# Patient Record
Sex: MALE | Race: WHITE | NOT HISPANIC OR LATINO | Employment: FULL TIME | URBAN - METROPOLITAN AREA
[De-identification: names, ages, dates, MRNs, and addresses within clinical notes are randomized per-mention and may not be internally consistent; named-entity substitution may affect disease eponyms.]

---

## 2018-01-26 NOTE — PROGRESS NOTES
This note was created by combination of typed  and Dragon dictation.  Transcription errors may be present.  If there are any questions, please contact me.    Assessment & Plan  Nonintractable epilepsy without status epilepticus, unspecified epilepsy type-reports previous medications either didn't work or had side effects.  He is way overdue for follow-up with neurology and I put in referral.  Check labs.  -     Comprehensive metabolic panel; Future; Expected date: 01/29/2018  -     CBC auto differential; Future; Expected date: 01/29/2018  -     Ambulatory referral to Neurology    Anxiety-discussed with him that Xanax is not a good long-term control medication and I would not prescribe that in the future.   With a positive urine drug screen in the emergency room years ago I would avoid benzodiazepines and controlled substances.  After discussion he is agreeable to try Zoloft.  Start 50 mg and titrate up.  -     sertraline (ZOLOFT) 50 MG tablet; Take 1 tablet (50 mg total) by mouth once daily.  Dispense: 30 tablet; Refill: 11    Smoker-pre-contemplative stage of quitting    Hypertension, unspecified type-main focus is to try and get his pressures under control.  Recalls a history of erectile disorder with medication.  Could be from HCTZ.  For now start losartan /25.  The future may try monotherapy with losartan.  -     Comprehensive metabolic panel; Future; Expected date: 01/29/2018  -     losartan-hydrochlorothiazide 100-25 mg (HYZAAR) 100-25 mg per tablet; Take 1 tablet by mouth once daily.  Dispense: 30 tablet; Refill: 5    Hepatitis C virus infection without hepatic coma, unspecified chronicity-unconfirmed.  Check hepatitis C antibody, check RNA, check genotype.  If positive he'll need an ultrasound and referral to hepatology.  -     Hepatitis C antibody; Future; Expected date: 01/29/2018  -     Hepatitis C Viral RNA Genotype, LIPA; Future; Expected date: 01/29/2018  -     HEPATITIS C RNA,  QUANTITATIVE, PCR; Future; Expected date: 01/29/2018    Needs flu shot  -     Influenza - Quadrivalent (3 years & older) (PF)    Need for vaccination for Strep pneumoniae  -     Pneumococcal Polysaccharide Vaccine (23 Valent) (SQ/IM)    Right rotator cuff tendonitis-Home therapy handout.    Sebaceous cyst-On the neck.  Benign.  Reassurance.    Pineal gland cyst-check baseline labs first as he'll need MRI with contrast and a need to check creatinine function first.        Medications Discontinued During This Encounter   Medication Reason    hydrocodone-acetaminophen 5-325mg (NORCO) 5-325 mg per tablet Therapy completed       Follow-up: No Follow-up on file. follow-up 1 month BP check, lab results in the interim.      =================================================================      Chief Complaint   Patient presents with    [facility]       JENNIFER  Mamadou is a 42 y.o. male, last appointment with this clinic was Visit date not found.    New patient.  Last seen in our office 2012 and lost to follow up.  Hypertension, history of HCTZ, history of losartan  Anxiety, hx of xanax QID in the past.  Previous doctor would not fill it further and told him to see psych. Hx of zoloft, was wary of taking it.   Back pain with hx of narcotic therapy.  No longer taking that.   Hepatitis C by patient report, needs to see specialist.  Apparently this was diagnosed when he was hospitalized with cellulitis.  I see no labs in the system confirming this diagnosis.  Epilepsy.  Apparently diagnosed in [facility].  On disability for this. Hx of dilantin didn't work. Then depakote didn't like it. Then to tegretol.  Has been off of medication x years now.  Has not had a seizure recently he states.  But he does need to establish with neurology.  He is currently not on medications.  Possible pineal gland cyst seen on CT scan 2012.    He reports he has a home cuff and has been checking his blood pressures and they have been running high  all week.  Does recall a history of side effect of erectile dysfunction with his previous medications.  He notes anxiety, we did discuss that Xanax is not a good long-term control medication.  He was initially wary of trying Zoloft but after discussion would be agreeable to do so.  He has a cyst on his neck, MS, has not gone away despite manual pressure to try to decompress it.  Reports that he has a cyst on his brain.  I see CT scan from 2012 with possible pineal cyst.  It sounds however that his epilepsy may have preceded this cyst.  Reportedly was diagnosed with hepatitis C while hospitalized but I see no lab results from that.  He reports he was supposed to be referred to a specialist but never got that done.  Complaining of pain in his right shoulder without tender spot that is painful and pronounced.  No specific injury to this shoulder.  However does recall a remote history of fracture.  He is an active smoker, 1 pack a day.  Pre-contemplative stage of quitting.    Patient Care Team:  Primary Doctor No as PCP - General    Patient Active Problem List    Diagnosis Date Noted    Anxiety 01/29/2018    Smoker 01/29/2018    Pineal gland cyst 01/29/2018 9/22/2012 CT head: 16mm cystic pineal mass in which further characterization with contrast enhanced MR is recommended.  Considerations include pineocytoma as well pineoblastoma.      Hypertension     Epilepsy        PAST MEDICAL HISTORY:  Past Medical History:   Diagnosis Date    Cellulitis     recurrent    Epilepsy     Hypertension        PAST SURGICAL HISTORY:  Past Surgical History:   Procedure Laterality Date    FRACTURE SURGERY      Right hand     Family History   Problem Relation Age of Onset    Hypertension Mother     Heart disease Father     No Known Problems Sister     Anxiety disorder Brother     No Known Problems Brother        SOCIAL HISTORY:  Social History     Social History    Marital status:      Spouse name: N/A    Number  "of children: N/A    Years of education: N/A     Occupational History    disability from epilepsy      Social History Main Topics    Smoking status: Current Every Day Smoker     Packs/day: 1.00     Years: 30.00     Types: Cigarettes    Smokeless tobacco: Never Used    Alcohol use No    Drug use: No    Sexual activity: Yes     Partners: Female     Other Topics Concern    Not on file     Social History Narrative    No narrative on file       ALLERGIES AND MEDICATIONS: updated and reviewed.  Review of patient's allergies indicates:   Allergen Reactions    No known drug allergies      No current outpatient prescriptions on file.     No current facility-administered medications for this visit.        Review of Systems   Constitutional: Negative for chills and fever.   Respiratory: Negative for shortness of breath.    Cardiovascular: Negative for chest pain and palpitations.   Gastrointestinal: Negative for abdominal pain.   Genitourinary: Negative for dysuria.   Musculoskeletal: Positive for joint pain.   Skin: Negative for rash.   Neurological: Negative for headaches.   Psychiatric/Behavioral: Negative for hallucinations. The patient is nervous/anxious. The patient does not have insomnia.        Physical Exam   Vitals:    01/29/18 1559 01/29/18 1638   BP: (!) 186/125 (!) 160/110   BP Location:  Left arm   Patient Position:  Sitting   BP Method:  Large (Manual)   Pulse: 90    Temp: 99.1 °F (37.3 °C)    SpO2: 99%    Weight: 74.5 kg (164 lb 2.1 oz)    Height: 5' 10" (1.778 m)     Body mass index is 23.55 kg/m².  Weight: 74.5 kg (164 lb 2.1 oz)   Height: 5' 10" (177.8 cm)     Physical Exam   Constitutional: He is oriented to person, place, and time. He appears well-developed and well-nourished. No distress.   Eyes: EOM are normal.   Cardiovascular: Normal rate, regular rhythm and normal heart sounds.    No murmur heard.  Pulmonary/Chest: Effort normal and breath sounds normal.   Musculoskeletal: He exhibits " tenderness.   There is a slightly pronounced acromioclavicular joint on the right, some mild tenderness on moderate pressure palpation.  Cross body adduction elicits no pain.  Neer and Escobar maneuver is elicited discomfort.  Maneuver to internally rotate the shoulder causes discomfort.   Neurological: He is alert and oriented to person, place, and time. Coordination normal.   Skin: Skin is warm and dry.   Psychiatric: He has a normal mood and affect. His behavior is normal. Thought content normal.

## 2018-01-29 ENCOUNTER — OFFICE VISIT (OUTPATIENT)
Dept: FAMILY MEDICINE | Facility: CLINIC | Age: 43
End: 2018-01-29
Payer: MEDICARE

## 2018-01-29 VITALS
HEIGHT: 70 IN | TEMPERATURE: 99 F | OXYGEN SATURATION: 99 % | BODY MASS INDEX: 23.5 KG/M2 | DIASTOLIC BLOOD PRESSURE: 110 MMHG | HEART RATE: 90 BPM | WEIGHT: 164.13 LBS | SYSTOLIC BLOOD PRESSURE: 160 MMHG

## 2018-01-29 DIAGNOSIS — G40.909 NONINTRACTABLE EPILEPSY WITHOUT STATUS EPILEPTICUS, UNSPECIFIED EPILEPSY TYPE: Primary | ICD-10-CM

## 2018-01-29 DIAGNOSIS — I10 HYPERTENSION, UNSPECIFIED TYPE: ICD-10-CM

## 2018-01-29 DIAGNOSIS — F41.9 ANXIETY: ICD-10-CM

## 2018-01-29 DIAGNOSIS — B19.20 HEPATITIS C VIRUS INFECTION WITHOUT HEPATIC COMA, UNSPECIFIED CHRONICITY: ICD-10-CM

## 2018-01-29 DIAGNOSIS — M75.81 RIGHT ROTATOR CUFF TENDONITIS: ICD-10-CM

## 2018-01-29 DIAGNOSIS — F17.200 SMOKER: ICD-10-CM

## 2018-01-29 DIAGNOSIS — Z23 NEEDS FLU SHOT: ICD-10-CM

## 2018-01-29 DIAGNOSIS — E34.8 PINEAL GLAND CYST: ICD-10-CM

## 2018-01-29 DIAGNOSIS — L72.3 SEBACEOUS CYST: ICD-10-CM

## 2018-01-29 DIAGNOSIS — Z23 NEED FOR VACCINATION FOR STREP PNEUMONIAE: ICD-10-CM

## 2018-01-29 PROCEDURE — G0009 ADMIN PNEUMOCOCCAL VACCINE: HCPCS | Mod: S$GLB,ICN,, | Performed by: INTERNAL MEDICINE

## 2018-01-29 PROCEDURE — 99203 OFFICE O/P NEW LOW 30 MIN: CPT | Mod: S$GLB,,, | Performed by: INTERNAL MEDICINE

## 2018-01-29 PROCEDURE — 99999 PR PBB SHADOW E&M-EST. PATIENT-LVL IV: CPT | Mod: PBBFAC,,, | Performed by: INTERNAL MEDICINE

## 2018-01-29 PROCEDURE — G0008 ADMIN INFLUENZA VIRUS VAC: HCPCS | Mod: ICN,S$GLB,, | Performed by: INTERNAL MEDICINE

## 2018-01-29 PROCEDURE — 90732 PPSV23 VACC 2 YRS+ SUBQ/IM: CPT | Mod: S$GLB,ICN,, | Performed by: INTERNAL MEDICINE

## 2018-01-29 PROCEDURE — 90686 IIV4 VACC NO PRSV 0.5 ML IM: CPT | Mod: ICN,S$GLB,, | Performed by: INTERNAL MEDICINE

## 2018-01-29 PROCEDURE — 99499 UNLISTED E&M SERVICE: CPT | Mod: S$GLB,,, | Performed by: INTERNAL MEDICINE

## 2018-01-29 RX ORDER — LOSARTAN POTASSIUM AND HYDROCHLOROTHIAZIDE 25; 100 MG/1; MG/1
1 TABLET ORAL DAILY
Qty: 30 TABLET | Refills: 5 | Status: SHIPPED | OUTPATIENT
Start: 2018-01-29 | End: 2019-01-29

## 2018-01-29 RX ORDER — HYDROCODONE BITARTRATE AND ACETAMINOPHEN 5; 325 MG/1; MG/1
TABLET ORAL
COMMUNITY
Start: 2017-12-13 | End: 2018-01-29 | Stop reason: ALTCHOICE

## 2018-01-29 RX ORDER — SERTRALINE HYDROCHLORIDE 50 MG/1
50 TABLET, FILM COATED ORAL DAILY
Qty: 30 TABLET | Refills: 11 | Status: SHIPPED | OUTPATIENT
Start: 2018-01-29 | End: 2018-02-28

## 2018-01-29 NOTE — LETTER
January 29, 2018      [facility]  [address]  [address]  [place] [address]           [facility]  [address]  [place] [address]  Phone: [phone]  Fax: [phone]          Patient: Mamadou Meier   MR Number: [id number]   YOB: 1975   Date of Visit: 1/29/2018       Dear [facility]:    Thank you for referring Mamadou Meier to me for evaluation. Attached you will find relevant portions of my assessment and plan of care.    If you have questions, please do not hesitate to call me. I look forward to following Mamadou Meier along with you.    Sincerely,    Kermit Mccall MD    Enclosure  CC:  No Recipients    If you would like to receive this communication electronically, please contact [email or web address] or [phone] to request more information on [facility] Link access.    For providers and/or their staff who would like to refer a patient to [facility], please contact us through our one-stop-shop provider referral line, [facility], at [phone].    If you feel you have received this communication in error or would no longer like to receive these types of communications, please e-mail [email or web address]

## 2018-01-29 NOTE — PROGRESS NOTES
Patient tolerated vaccinations well. VIS given to patient. Patient instructed to wait 15 min before leaving. Patient verbalized understanding.

## 2018-01-30 ENCOUNTER — TELEPHONE (OUTPATIENT)
Dept: FAMILY MEDICINE | Facility: CLINIC | Age: 43
End: 2018-01-30

## 2018-01-30 NOTE — LETTER
January 31, 2018    Mamadou Hardeep  [facility] [facility] [place] [address]             [address] [address]  Phone: [phone]  Fax: [phone] Dear  Hardeep:    I have been unable to reach you by phone for your appointment to Neurology.  Please call me at the clinic [phone] to book your appointment.      If you have any questions or concerns, please don't hesitate to call.    Sincerely,        Ellen Barker MA

## 2018-02-23 ENCOUNTER — OFFICE VISIT (OUTPATIENT)
Dept: NEUROLOGY | Facility: CLINIC | Age: 43
End: 2018-02-23
Payer: MEDICARE

## 2018-02-23 VITALS
DIASTOLIC BLOOD PRESSURE: 105 MMHG | SYSTOLIC BLOOD PRESSURE: 149 MMHG | BODY MASS INDEX: 23.42 KG/M2 | HEART RATE: 78 BPM | WEIGHT: 163.56 LBS | HEIGHT: 70 IN

## 2018-02-23 DIAGNOSIS — F32.A DEPRESSION, UNSPECIFIED DEPRESSION TYPE: ICD-10-CM

## 2018-02-23 DIAGNOSIS — I10 ESSENTIAL HYPERTENSION: ICD-10-CM

## 2018-02-23 DIAGNOSIS — G40.219 PARTIAL EPILEPSY, WITH IMPAIRMENT OF CONSCIOUSNESS, WITH INTRACTABLE EPILEPSY: Primary | ICD-10-CM

## 2018-02-23 DIAGNOSIS — F17.200 SMOKER: ICD-10-CM

## 2018-02-23 PROCEDURE — 99205 OFFICE O/P NEW HI 60 MIN: CPT | Mod: S$GLB,,, | Performed by: PSYCHIATRY & NEUROLOGY

## 2018-02-23 PROCEDURE — 3008F BODY MASS INDEX DOCD: CPT | Mod: S$GLB,,, | Performed by: PSYCHIATRY & NEUROLOGY

## 2018-02-23 PROCEDURE — 99999 PR PBB SHADOW E&M-EST. PATIENT-LVL III: CPT | Mod: PBBFAC,,, | Performed by: PSYCHIATRY & NEUROLOGY

## 2018-02-23 NOTE — ASSESSMENT & PLAN NOTE
43yo M with hx of seizures x 2005:  - infrequent episodes (q 2-3 monthly); not on AED    Plan:  Extended (> 1 hour) sleep deprived EEG  EMU for characterization/quantification of episodes: will start AED after characterization of sz  MRI Brain  Labs: cbc, cmp  Sz log  Sz precautions/restrictions    Plan of care was discussed in detail with patient

## 2018-02-23 NOTE — LETTER
February 26, 2018      Kermit Mccall MD  [address]  [place] [address]           [facility] - Neurology Epilepsy  [address]  [facility] [address]  Phone: [phone]  Fax: [phone]          Patient: Mamadou Meier   MR Number: [id number]   YOB: 1975   Date of Visit: 2/23/2018       Dear Dr. Kermit Mccall:    Thank you for referring Mamadou Meier to me for evaluation. Attached you will find relevant portions of my assessment and plan of care.    If you have questions, please do not hesitate to call me. I look forward to following Mamadou Meier along with you.    Sincerely,    Kate Sylvester MD    Enclosure  CC:  No Recipients    If you would like to receive this communication electronically, please contact [email or web address] or [phone] to request more information on [employer] Link access.    For providers and/or their staff who would like to refer a patient to [facility], please contact us through our one-stop-shop provider referral line, [facility], at [phone].    If you feel you have received this communication in error or would no longer like to receive these types of communications, please e-mail [email or web address]
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
## 2018-02-26 ENCOUNTER — LAB VISIT (OUTPATIENT)
Dept: LAB | Facility: HOSPITAL | Age: 43
End: 2018-02-26
Attending: INTERNAL MEDICINE
Payer: MEDICARE

## 2018-02-26 DIAGNOSIS — I10 HYPERTENSION, UNSPECIFIED TYPE: ICD-10-CM

## 2018-02-26 DIAGNOSIS — B19.20 HEPATITIS C VIRUS INFECTION WITHOUT HEPATIC COMA, UNSPECIFIED CHRONICITY: ICD-10-CM

## 2018-02-26 DIAGNOSIS — G40.909 NONINTRACTABLE EPILEPSY WITHOUT STATUS EPILEPTICUS, UNSPECIFIED EPILEPSY TYPE: ICD-10-CM

## 2018-02-26 LAB
ALBUMIN SERPL BCP-MCNC: 4 G/DL
ALP SERPL-CCNC: 80 U/L
ALT SERPL W/O P-5'-P-CCNC: 34 U/L
ANION GAP SERPL CALC-SCNC: 9 MMOL/L
AST SERPL-CCNC: 24 U/L
BASOPHILS # BLD AUTO: 0.02 K/UL
BASOPHILS NFR BLD: 0.3 %
BILIRUB SERPL-MCNC: 0.5 MG/DL
BUN SERPL-MCNC: 23 MG/DL
CALCIUM SERPL-MCNC: 9.5 MG/DL
CHLORIDE SERPL-SCNC: 103 MMOL/L
CO2 SERPL-SCNC: 25 MMOL/L
CREAT SERPL-MCNC: 1.1 MG/DL
DIFFERENTIAL METHOD: NORMAL
EOSINOPHIL # BLD AUTO: 0.2 K/UL
EOSINOPHIL NFR BLD: 2.2 %
ERYTHROCYTE [DISTWIDTH] IN BLOOD BY AUTOMATED COUNT: 13.6 %
EST. GFR  (AFRICAN AMERICAN): >60 ML/MIN/1.73 M^2
EST. GFR  (NON AFRICAN AMERICAN): >60 ML/MIN/1.73 M^2
GLUCOSE SERPL-MCNC: 86 MG/DL
HCT VFR BLD AUTO: 45.9 %
HCV AB SERPL QL IA: POSITIVE
HGB BLD-MCNC: 16.1 G/DL
IMM GRANULOCYTES # BLD AUTO: 0.03 K/UL
IMM GRANULOCYTES NFR BLD AUTO: 0.4 %
LYMPHOCYTES # BLD AUTO: 1.8 K/UL
LYMPHOCYTES NFR BLD: 25 %
MCH RBC QN AUTO: 30.8 PG
MCHC RBC AUTO-ENTMCNC: 35.1 G/DL
MCV RBC AUTO: 88 FL
MONOCYTES # BLD AUTO: 0.7 K/UL
MONOCYTES NFR BLD: 10.2 %
NEUTROPHILS # BLD AUTO: 4.4 K/UL
NEUTROPHILS NFR BLD: 61.9 %
NRBC BLD-RTO: 0 /100 WBC
PLATELET # BLD AUTO: 324 K/UL
PMV BLD AUTO: 10.8 FL
POTASSIUM SERPL-SCNC: 4.3 MMOL/L
PROT SERPL-MCNC: 7.8 G/DL
RBC # BLD AUTO: 5.22 M/UL
SODIUM SERPL-SCNC: 137 MMOL/L
WBC # BLD AUTO: 7.13 K/UL

## 2018-02-26 PROCEDURE — 87902 NFCT AGT GNTYP ALYS HEP C: CPT

## 2018-02-26 PROCEDURE — 85025 COMPLETE CBC W/AUTO DIFF WBC: CPT

## 2018-02-26 PROCEDURE — 87522 HEPATITIS C REVRS TRNSCRPJ: CPT

## 2018-02-26 PROCEDURE — 80053 COMPREHEN METABOLIC PANEL: CPT

## 2018-02-26 PROCEDURE — 86803 HEPATITIS C AB TEST: CPT

## 2018-02-26 PROCEDURE — 36415 COLL VENOUS BLD VENIPUNCTURE: CPT | Mod: PO

## 2018-02-27 ENCOUNTER — HOSPITAL ENCOUNTER (OUTPATIENT)
Dept: NEUROLOGY | Facility: CLINIC | Age: 43
Discharge: HOME | End: 2018-02-27
Payer: MEDICARE

## 2018-02-27 ENCOUNTER — HOSPITAL ENCOUNTER (OUTPATIENT)
Dept: RADIOLOGY | Facility: HOSPITAL | Age: 43
Discharge: HOME | End: 2018-02-27
Attending: PSYCHIATRY & NEUROLOGY
Payer: MEDICARE

## 2018-02-27 DIAGNOSIS — G40.219 PARTIAL EPILEPSY, WITH IMPAIRMENT OF CONSCIOUSNESS, WITH INTRACTABLE EPILEPSY: ICD-10-CM

## 2018-02-27 PROCEDURE — 70551 MRI BRAIN STEM W/O DYE: CPT | Mod: TC

## 2018-02-27 PROCEDURE — 95813 PR EEG, EXTENDED, 61-119 MINS: ICD-10-PCS | Mod: S$GLB,,, | Performed by: PSYCHIATRY & NEUROLOGY

## 2018-02-27 PROCEDURE — 70551 MRI BRAIN STEM W/O DYE: CPT | Mod: 26,,, | Performed by: RADIOLOGY

## 2018-02-27 PROCEDURE — 95813 EEG EXTND MNTR 61-119 MIN: CPT | Mod: S$GLB,,, | Performed by: PSYCHIATRY & NEUROLOGY

## 2018-02-27 NOTE — PROGRESS NOTES
"Protocol had note at beginning "For Dr Rachel angle axials to temporal lobe" so I did as such.  The last sequence said "obl t2 ax angle perpindicular to cor obl" which made me question why the redundance.  I called neuro to clarify and see if I needed to repeat in our typical plane, and was told to just do an ax as we would normally do.  Later I was told that the protocol I used was originally a trial.  I'm going to mention it on Trello so that this mistake can be avoided in the future.  "

## 2018-02-28 ENCOUNTER — LAB VISIT (OUTPATIENT)
Dept: LAB | Facility: HOSPITAL | Age: 43
End: 2018-02-28
Attending: INTERNAL MEDICINE
Payer: MEDICARE

## 2018-02-28 ENCOUNTER — OFFICE VISIT (OUTPATIENT)
Dept: FAMILY MEDICINE | Facility: CLINIC | Age: 43
End: 2018-02-28
Payer: MEDICARE

## 2018-02-28 VITALS
WEIGHT: 162.56 LBS | TEMPERATURE: 98 F | HEIGHT: 70 IN | HEART RATE: 90 BPM | OXYGEN SATURATION: 98 % | BODY MASS INDEX: 23.27 KG/M2 | SYSTOLIC BLOOD PRESSURE: 136 MMHG | DIASTOLIC BLOOD PRESSURE: 88 MMHG

## 2018-02-28 DIAGNOSIS — F41.9 ANXIETY: Primary | ICD-10-CM

## 2018-02-28 DIAGNOSIS — G40.219 PARTIAL EPILEPSY, WITH IMPAIRMENT OF CONSCIOUSNESS, WITH INTRACTABLE EPILEPSY: ICD-10-CM

## 2018-02-28 DIAGNOSIS — E34.8 PINEAL GLAND CYST: ICD-10-CM

## 2018-02-28 DIAGNOSIS — Z11.3 SCREEN FOR STD (SEXUALLY TRANSMITTED DISEASE): ICD-10-CM

## 2018-02-28 DIAGNOSIS — Z13.6 ENCOUNTER FOR SCREENING FOR CARDIOVASCULAR DISORDERS: ICD-10-CM

## 2018-02-28 DIAGNOSIS — I10 ESSENTIAL HYPERTENSION: ICD-10-CM

## 2018-02-28 LAB
CHOLEST SERPL-MCNC: 191 MG/DL
HDLC SERPL-MCNC: 36 MG/DL

## 2018-02-28 PROCEDURE — 99499 UNLISTED E&M SERVICE: CPT | Mod: S$GLB,,, | Performed by: INTERNAL MEDICINE

## 2018-02-28 PROCEDURE — 83718 ASSAY OF LIPOPROTEIN: CPT

## 2018-02-28 PROCEDURE — 36415 COLL VENOUS BLD VENIPUNCTURE: CPT | Mod: PO

## 2018-02-28 PROCEDURE — 99214 OFFICE O/P EST MOD 30 MIN: CPT | Mod: S$GLB,,, | Performed by: INTERNAL MEDICINE

## 2018-02-28 PROCEDURE — 82465 ASSAY BLD/SERUM CHOLESTEROL: CPT

## 2018-02-28 PROCEDURE — 86703 HIV-1/HIV-2 1 RESULT ANTBDY: CPT

## 2018-02-28 PROCEDURE — 99999 PR PBB SHADOW E&M-EST. PATIENT-LVL III: CPT | Mod: PBBFAC,,, | Performed by: INTERNAL MEDICINE

## 2018-02-28 NOTE — PROGRESS NOTES
This note was created by combination of typed  and Dragon dictation.  Transcription errors may be present.  If there are any questions, please contact me.    Assessment & Plan:   Anxiety - he does not want to take a scheduled SSRI.  We talked about alternatives such as trazodone is not interested in that either.  History of benzodiazepines and I definitely would not start that.    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  Had recent MRI brain without contrast.    Essential hypertension-stable, on new start Hyzaar.  No change.    Screen for STD (sexually transmitted disease)-check HIV.  Hep C pending.  -     HIV-1 and HIV-2 antibodies; Future; Expected date: 02/28/2018    Encounter for screening for cardiovascular disorders-check nonfasting HDL and total cholesterol  -     Cholesterol, total; Future; Expected date: 02/28/2018  -     HDL CHOLESTEROL; Future; Expected date: 02/28/2018    Positive hep C antibody though the confirmation viral load is pending.  If positive referral to liver clinic.    Medications Discontinued During This Encounter   Medication Reason    sertraline (ZOLOFT) 50 MG tablet      Modified Medications    No medications on file     New Prescriptions    No medications on file       Follow Up: No Follow-up on file. follow-up 6 months recall entered        Subjective:     Chief Complaint   Patient presents with    Hypertension       JENNIFER Cedillo is a 42 y.o. male, last appointment with this clinic was 1/29/2018.    Hypertension, history of HCTZ, history of losartan  Anxiety, hx of xanax QID in the past.  Zoloft 1/2018  Back pain with hx of narcotic therapy.  No longer taking that.   Hepatitis C by patient report,  Epilepsy.  Apparently diagnosed in [facility].  On disability for this. Hx of dilantin didn't work. Then depakote didn't like it. Then to tegretol.    Possible pineal gland cyst seen on CT scan 2012.    Last visit - started on zoloft.  Referred to neuro -  further testing ordered.  HTN - started on losartan HCT.  Historically possible SE of ED with HCTZ?  Pineal gland cyst - will ventually need MRI with contrast.  Labs - HCV Ab positive VL pending.   MRI brain without con pending results.   Neuro referred him to psych.    MRI shows no mesial sclerosis.  Something suspicious for pineal cyst but needs contrast.   Had EEG results pending.  BP better on intake today. Taking losartan HCTZ denies SE but notes polyuria. Takes in the evening, recommended he try in the morning.   Took the zoloft for a week, but decided he didn't need to take something daily. Would like to take medication on a PRN basis really. Cannot estimate how often he would take PRN medication but maybe every other day. Really more to help him sleep.  But not interested in trazodone.  Was wondering if HIV was ordered - it was not.  He would like to check.  Last high-risk partner was more than a year ago.    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  Considerations include pineocytoma as well pineoblastoma.      Hypertension     Epilepsy        PAST MEDICAL HISTORY:  Past Medical History:   Diagnosis Date    Cellulitis     recurrent    Epilepsy     Hypertension        PAST SURGICAL HISTORY:  Past Surgical History:   Procedure Laterality Date    FRACTURE SURGERY      Right hand       SOCIAL HISTORY:  Social History     Social History    Marital status:      Spouse name: N/A    Number of children: N/A    Years of education: N/A     Occupational History    disability from epilepsy   "    Social History Main Topics    Smoking status: Current Every Day Smoker     Packs/day: 1.00     Years: 30.00     Types: Cigarettes    Smokeless tobacco: Never Used    Alcohol use No    Drug use: No    Sexual activity: Yes     Partners: Female     Other Topics Concern    Not on file     Social History Narrative    No narrative on file       ALLERGIES AND MEDICATIONS: updated and reviewed.  Review of patient's allergies indicates:   Allergen Reactions    No known drug allergies      Current Outpatient Prescriptions   Medication Sig Dispense Refill    losartan-hydrochlorothiazide 100-25 mg (HYZAAR) 100-25 mg per tablet Take 1 tablet by mouth once daily. 30 tablet 5    sertraline (ZOLOFT) 50 MG tablet Take 1 tablet (50 mg total) by mouth once daily. 30 tablet 11     No current facility-administered medications for this visit.      Not taking Zoloft    Review of Systems   Constitutional: Negative for chills and fever.   Respiratory: Negative for shortness of breath.    Cardiovascular: Negative for chest pain and palpitations.       Objective:   Physical Exam   Vitals:    02/28/18 1312   BP: 136/88   Pulse: 90   Temp: 98 °F (36.7 °C)   SpO2: 98%   Weight: 73.8 kg (162 lb 9.4 oz)   Height: 5' 10" (1.778 m)    Body mass index is 23.33 kg/m².  Weight: 73.8 kg (162 lb 9.4 oz)   Height: 5' 10" (177.8 cm)     Physical Exam   Constitutional: He is oriented to person, place, and time. He appears well-developed and well-nourished. No distress.   Eyes: EOM are normal.   Cardiovascular: Normal rate, regular rhythm and normal heart sounds.    No murmur heard.  Pulmonary/Chest: Effort normal and breath sounds normal.   Musculoskeletal: Normal range of motion.   Neurological: He is alert and oriented to person, place, and time. Coordination normal.   Skin: Skin is warm and dry.   Psychiatric: He has a normal mood and affect. His behavior is normal. Thought content normal.          Component      Latest Ref Rng & Units " 2/26/2018   WBC      3.90 - 12.70 K/uL 7.13   RBC      4.60 - 6.20 M/uL 5.22   Hemoglobin      14.0 - 18.0 g/dL 16.1   Hematocrit      40.0 - 54.0 % 45.9   MCV      82 - 98 fL 88   MCH      27.0 - 31.0 pg 30.8   MCHC      32.0 - 36.0 g/dL 35.1   RDW      11.5 - 14.5 % 13.6   Platelets      150 - 350 K/uL 324   MPV      9.2 - 12.9 fL 10.8   Immature Granulocytes      0.0 - 0.5 % 0.4   Gran # (ANC)      1.8 - 7.7 K/uL 4.4   Immature Grans (Abs)      0.00 - 0.04 K/uL 0.03   Lymph #      1.0 - 4.8 K/uL 1.8   Mono #      0.3 - 1.0 K/uL 0.7   Eos #      0.0 - 0.5 K/uL 0.2   Baso #      0.00 - 0.20 K/uL 0.02   nRBC      0 /100 WBC 0   Gran%      38.0 - 73.0 % 61.9   Lymph%      18.0 - 48.0 % 25.0   Mono%      4.0 - 15.0 % 10.2   Eosinophil%      0.0 - 8.0 % 2.2   Basophil%      0.0 - 1.9 % 0.3   Differential Method       Automated   Sodium      136 - 145 mmol/L 137   Potassium      3.5 - 5.1 mmol/L 4.3   Chloride      95 - 110 mmol/L 103   CO2      23 - 29 mmol/L 25   Glucose      70 - 110 mg/dL 86   BUN, Bld      6 - 20 mg/dL 23 (H)   Creatinine      0.5 - 1.4 mg/dL 1.1   Calcium      8.7 - 10.5 mg/dL 9.5   Total Protein      6.0 - 8.4 g/dL 7.8   Albumin      3.5 - 5.2 g/dL 4.0   Total Bilirubin      0.1 - 1.0 mg/dL 0.5   Alkaline Phosphatase      55 - 135 U/L 80   AST      10 - 40 U/L 24   ALT      10 - 44 U/L 34   Anion Gap      8 - 16 mmol/L 9   eGFR if African American      >60 mL/min/1.73 m:2 >60.0   eGFR if non African American      >60 mL/min/1.73 m:2 >60.0

## 2018-03-01 LAB
HCV LOG: 7.18 LOG (10) IU/ML
HCV RNA QUANT PCR: ABNORMAL IU/ML
HCV, QUALITATIVE: DETECTED IU/ML
HIV 1+2 AB+HIV1 P24 AG SERPL QL IA: NEGATIVE

## 2018-03-01 NOTE — PROCEDURES
DATE OF SERVICE:  02/27/2018    ELECTROENCEPHALOGRAM REPORT  Extended Recording    METHODOLOGY:  Electroencephalographic (EEG) is recorded with electrodes placed   according to the International 10-20 placement system.  Thirty two (32) channels   of digital signal (sampling rate of 512/sec), including T1 and T2, were   simultaneously recorded from the scalp and may include EKG, EMG, and/or eye   monitors.  Recording band pass was 0.1 to 512 Hz.  Digital video recording of   the patient is simultaneously recorded with the EEG.  The patient is instructed   to report clinical symptoms which may occur during the recording session.  EEG   and video recording are stored and archived in digital format.  Activation   procedures, which include photic stimulation, hyperventilation and instructing   patients to perform simple tasks, are done in selected patients.    The EEG is displayed on a monitor screen and can be reviewed using different   montages.  Computer-assisted analysis is employed to detect spike and   electrographic seizure activity.  The entire record is submitted for computer   analysis.  The entire recording is visually reviewed, and the times identified   by computer analysis as being spikes or seizures are reviewed again.    Compressed spectral analysis (CSA) is also performed on the activity recorded   from each individual channel.  This is displayed as a power display of   frequencies from 0 to 30 Hz over time.   The CSA is reviewed looking for   asymmetries in power between homologous areas of the scalp, then compared with   the original EEG recording.    [employer] software was also utilized in the review of this study.  This software   suite analyzes the EEG recording in multiple domains.  Coherence and rhythmicity   are computed to identify EEG sections which may contain organized seizures.    Each channel undergoes analysis to detect the presence of spike and sharp waves   which have special and  morphological characteristics of epileptic activity.  The   routine EEG recording is converted from special into frequency domain.  This is   then displayed comparing homologous areas to identify areas of significant   asymmetry.  Algorithm to identify non-cortically generated artifact is used to   separate artifact from the EEG.    RECORDING TIMES:  Duration is 1 hour and 11 minutes.    EEG FINDINGS:  This record consists primarily of low-amplitude beta activity   with admixed alpha activity initially and the patient appears to be drowsy.    After the first few minutes, sleep spindles are seen centrally along with vertex   sharp waves and K complexes signifying stage N2 sleep.  The patient maintains   stage II sleep for several minutes with good quality sleep architecture seen   consistently until midway through the study.    At the midway point of the study, the patient arouses with faster frequencies   along with EMG artifact and eye blink artifact seen and the emergence of a 9 Hz   posterior dominant rhythm.  Photic stimulation is performed and bilateral   driving is seen at multiple frequencies.  In the latter half of the study, the   patient briefly becomes drowsy and then arouses and is in and out of drowsiness   for the remainder of the study.  Alpha rhythm is not particularly well formed.    The patient does appear drowsy and mildly restless during the study.    There were no epileptiform discharges.  There were no focal findings.  There   were no seizures.  There were no clinical events.    INTERPRETATION:  Normal awake and asleep EEG.      LUIZ/RUBINA  dd: 02/28/2018 15:19:05 (CST)  td: 03/01/2018 05:17:30 (CST)  Doc ID   #[id number]  Job ID #[id number]    CC:

## 2018-03-02 PROBLEM — B18.2 HEPATITIS C CARRIER: Status: ACTIVE | Noted: 2018-03-02

## 2018-03-02 PROBLEM — E78.2 MIXED HYPERLIPIDEMIA: Status: ACTIVE | Noted: 2018-03-02

## 2018-03-02 LAB — HCV GENTYP SERPL NAA+PROBE: NORMAL

## 2018-03-02 NOTE — PROGRESS NOTES
8 year ASCVD high with risk factors would start statin.  HCV 2b. Needs to see hepatology.  HIV negative.  Left VM on cell phone asking him to call back. Will need pravastatin start low dose given HCV active and refer to liver clinic.

## 2018-03-05 ENCOUNTER — TELEPHONE (OUTPATIENT)
Dept: FAMILY MEDICINE | Facility: CLINIC | Age: 43
End: 2018-03-05

## 2018-03-05 DIAGNOSIS — E78.2 MIXED HYPERLIPIDEMIA: ICD-10-CM

## 2018-03-05 DIAGNOSIS — B18.2 HEPATITIS C CARRIER: Primary | ICD-10-CM

## 2018-03-05 RX ORDER — PRAVASTATIN SODIUM 20 MG/1
20 TABLET ORAL DAILY
Qty: 90 TABLET | Refills: 1 | Status: SHIPPED | OUTPATIENT
Start: 2018-03-05 | End: 2018-09-22

## 2018-03-05 NOTE — TELEPHONE ENCOUNTER
Left VM on listed phone #.  He has high cholesterol and needs to be on cholesterol medicine.  I will start pravastatin 20 mg.     He has hepatitis C (he knows this - he was told at previous hospitalization that he was HCV positive, but I had to confirm this).  I will refer him to liver clinic.    HIV negative.

## 2018-03-05 NOTE — TELEPHONE ENCOUNTER
----- Message from Sol Garcia sent at 3/5/2018  1:13 PM CST -----  Contact: self  Pt is requesting his lab results. States someone called him last week. Please contact him at [phone].    Thanks

## 2018-03-11 ENCOUNTER — DOCUMENTATION ONLY (OUTPATIENT)
Dept: TRANSPLANT | Facility: CLINIC | Age: 43
End: 2018-03-11

## 2018-03-11 NOTE — LETTER
March 11, 2018    Mamadou Meier  [address] [facility] [address]      Dear Mamadou Meier:    Your doctor has referred you to the [facility]. You will be contacted by our office and an initial appointment will then be scheduled for you.    We look forward to seeing you soon. If you have any further questions, please contact us at [phone].       Sincerely,        [facility]   [address] [place] [id number]  [phone]

## 2018-03-12 NOTE — NURSING
Pt records reviewed.   Pt will be referred to Hepatitis C clinic    Initial referral received  from the workque.   Referring Provider/diagnosis  LALY NAIK Provider:   Diagnosis: Hepatitis C carrier         Referral letter sent to provider and patient.

## 2018-03-21 DIAGNOSIS — R56.9 SEIZURE: Primary | ICD-10-CM

## 2018-03-26 ENCOUNTER — LAB VISIT (OUTPATIENT)
Dept: LAB | Facility: HOSPITAL | Age: 43
End: 2018-03-26
Payer: MEDICARE

## 2018-03-26 ENCOUNTER — INITIAL CONSULT (OUTPATIENT)
Dept: HEPATOLOGY | Facility: CLINIC | Age: 43
End: 2018-03-26
Payer: MEDICARE

## 2018-03-26 VITALS
OXYGEN SATURATION: 97 % | RESPIRATION RATE: 19 BRPM | SYSTOLIC BLOOD PRESSURE: 138 MMHG | BODY MASS INDEX: 23.26 KG/M2 | HEIGHT: 70 IN | TEMPERATURE: 99 F | HEART RATE: 89 BPM | WEIGHT: 162.5 LBS | DIASTOLIC BLOOD PRESSURE: 96 MMHG

## 2018-03-26 DIAGNOSIS — B18.2 CHRONIC HEPATITIS C WITHOUT HEPATIC COMA: ICD-10-CM

## 2018-03-26 DIAGNOSIS — B18.2 CHRONIC HEPATITIS C WITHOUT HEPATIC COMA: Primary | ICD-10-CM

## 2018-03-26 LAB
ALBUMIN SERPL BCP-MCNC: 4.2 G/DL
ALP SERPL-CCNC: 91 U/L
ALT SERPL W/O P-5'-P-CCNC: 32 U/L
ANION GAP SERPL CALC-SCNC: 12 MMOL/L
AST SERPL-CCNC: 23 U/L
BASOPHILS # BLD AUTO: 0.03 K/UL
BASOPHILS NFR BLD: 0.4 %
BILIRUB SERPL-MCNC: 0.6 MG/DL
BUN SERPL-MCNC: 17 MG/DL
CALCIUM SERPL-MCNC: 9.6 MG/DL
CHLORIDE SERPL-SCNC: 100 MMOL/L
CO2 SERPL-SCNC: 26 MMOL/L
CREAT SERPL-MCNC: 1.1 MG/DL
DIFFERENTIAL METHOD: ABNORMAL
EOSINOPHIL # BLD AUTO: 0.1 K/UL
EOSINOPHIL NFR BLD: 1.3 %
ERYTHROCYTE [DISTWIDTH] IN BLOOD BY AUTOMATED COUNT: 14.3 %
EST. GFR  (AFRICAN AMERICAN): >60 ML/MIN/1.73 M^2
EST. GFR  (NON AFRICAN AMERICAN): >60 ML/MIN/1.73 M^2
GLUCOSE SERPL-MCNC: 94 MG/DL
HCT VFR BLD AUTO: 46.8 %
HGB BLD-MCNC: 16.2 G/DL
IMM GRANULOCYTES # BLD AUTO: 0.02 K/UL
IMM GRANULOCYTES NFR BLD AUTO: 0.3 %
LYMPHOCYTES # BLD AUTO: 1.8 K/UL
LYMPHOCYTES NFR BLD: 25.4 %
MCH RBC QN AUTO: 31.2 PG
MCHC RBC AUTO-ENTMCNC: 34.6 G/DL
MCV RBC AUTO: 90 FL
MONOCYTES # BLD AUTO: 0.8 K/UL
MONOCYTES NFR BLD: 11.4 %
NEUTROPHILS # BLD AUTO: 4.3 K/UL
NEUTROPHILS NFR BLD: 61.2 %
NRBC BLD-RTO: 0 /100 WBC
PLATELET # BLD AUTO: 325 K/UL
PMV BLD AUTO: 10.7 FL
POTASSIUM SERPL-SCNC: 4.1 MMOL/L
PROT SERPL-MCNC: 7.9 G/DL
RBC # BLD AUTO: 5.19 M/UL
SODIUM SERPL-SCNC: 138 MMOL/L
WBC # BLD AUTO: 6.96 K/UL

## 2018-03-26 PROCEDURE — 99999 PR PBB SHADOW E&M-EST. PATIENT-LVL IV: CPT | Mod: PBBFAC,,, | Performed by: PHYSICIAN ASSISTANT

## 2018-03-26 PROCEDURE — 86706 HEP B SURFACE ANTIBODY: CPT

## 2018-03-26 PROCEDURE — 99204 OFFICE O/P NEW MOD 45 MIN: CPT | Mod: S$GLB,,, | Performed by: PHYSICIAN ASSISTANT

## 2018-03-26 PROCEDURE — 87340 HEPATITIS B SURFACE AG IA: CPT

## 2018-03-26 PROCEDURE — 85025 COMPLETE CBC W/AUTO DIFF WBC: CPT

## 2018-03-26 PROCEDURE — 86790 VIRUS ANTIBODY NOS: CPT

## 2018-03-26 PROCEDURE — 86704 HEP B CORE ANTIBODY TOTAL: CPT

## 2018-03-26 PROCEDURE — 80053 COMPREHEN METABOLIC PANEL: CPT

## 2018-03-26 PROCEDURE — 36415 COLL VENOUS BLD VENIPUNCTURE: CPT

## 2018-03-26 PROCEDURE — 99499 UNLISTED E&M SERVICE: CPT | Mod: S$GLB,,, | Performed by: PHYSICIAN ASSISTANT

## 2018-03-26 NOTE — PROGRESS NOTES
HEPATOLOGY CLINIC VISIT NOTE - HCV clinic    REFERRING PROVIDER: Dr. Kermit Mccall     CHIEF COMPLAINT: Hepatitis C - discuss treatment    HISTORY: This is a 42 y.o. White male with chronic hepatitis C, here for initial evaluation. Labs reveal that he is 2b with a HCV RNA of 15,259,664 IU/mL. He has not had abdominal imaging or fibrosis staging. His transaminases are essentially normal and he has normal synthetic liver function. His PLTs are preserved. He reports his HCV was diagnosed several years ago  But he has not been treated.     His PMH is significant for HTN and HLD. He also has a h/o epilepsy, reports seizures every few months. Dilantin, Depakote and tegretol in past. He didn't seek medical care for several years, now reestablished.     He has a h/o daily HA, reports taking BC powder, 2-4 per day and takes kratom for pain.     HCV history:  Genotype 2b  HCV RNA: 15,259,664 IU/mL  Treatment naive     Risk Factors:  Blood transfusion- (-)  Tattoos- (+) unregulated   Incarceration- (+) alf tattoos   IV/MORENITA- (+) IV and IN in past, last use several years     Liver staging:  No formal staging  AST 24, ALT 34  Tbili 0.5   Albumin 4.0  PLTs 324    Denies jaundice, dark urine, abdominal distention, hematemesis, melena, slowed mentation.   No abnormal skin rashes. No generalized joint pain.                  Past Medical History:   Diagnosis Date    Cellulitis     recurrent    Epilepsy     Hypertension      Past Surgical History:   Procedure Laterality Date    FRACTURE SURGERY      Right hand     FAMILY HISTORY: Negative for liver disease    SOCIAL HISTORY:   Disabled    History   Smoking Status    Current Every Day Smoker    Packs/day: 1.00    Years: 30.00    Types: Cigarettes   Smokeless Tobacco    Never Used     History   Alcohol Use No   denies current use, reports PH of heavy use. Mid to late 20s heaviest, whisky daily 5th   Heavy drinking again later in life  History   Drug Use No     ROS:   No fever,  chills, weight loss  No chest pain, dyspnea, cough  No abdominal pain,  nausea, vomiting   No skin rashes   (+) headaches, no visual changes  No lower extremity edema  No depression or anxiety      PHYSICAL EXAM:  Friendly White male, in no acute distress; alert and oriented to person, place and time  VITALS: reviewed  HEENT: Sclerae anicteric.   NECK: Supple  CVS: Regular rate and rhythm. No murmurs  LUNGS: Normal respiratory effort. Clear bilaterally  ABDOMEN: Flat, soft, nontender. No organomegaly or masses. No ascites or hernias   SKIN: Warm and dry. No jaundice, No obvious rashes.   EXTREMITIES: No lower extremity edema  NEURO/PSYCH: Normal gate. Memory intact. Thought and speech pattern appropriate. Behavior normal. No depression or anxiety noted.    RECENT LABS:  Lab Results   Component Value Date    WBC 7.13 02/26/2018    HGB 16.1 02/26/2018     02/26/2018     No results found for: INR  Lab Results   Component Value Date    AST 24 02/26/2018    ALT 34 02/26/2018    BILITOT 0.5 02/26/2018    ALBUMIN 4.0 02/26/2018    ALKPHOS 80 02/26/2018    CREATININE 1.1 02/26/2018    BUN 23 (H) 02/26/2018     02/26/2018    K 4.3 02/26/2018     RECENT IMAGING:  None     ASSESSMENT  42 y.o. White male with:  1. CHRONIC HEPATITIS C, GENOTYPE 2 - treatment naive  -- Prior HCV treatment -  -- Elevated transaminases  -- unknown immunity to HAV and HBV    EDUCATION:  The natural history of Hepatitis C, including potential progression to cirrhosis was reviewed. Complications of cirrhosis, including hepatic decompensation, liver cancer, liver failure, need for liver transplant, and death were reviewed.     We discussed the increased progression of liver disease secondary to alcohol use; patient was advised to avoid alcohol completely.     Transmission of Hepatitis C was reviewed, including possible sexual transmission. Sexual contacts should be screened.     Risk of vertical transmission of Hepatitis C from mother to  baby was reviewed. Children should be screened.     Patient should avoid sharing personal products such as razors, toothbrushes, etc.     We reviewed that vaccination against Hepatitis A and Hepatitis B is recommended if patient does not already have immunity.    Recommend avoiding raw seafood.    Limit acetaminophen to 2000mg daily.    PLAN:  1. Labs and imaging  Orders Placed This Encounter   Procedures    US Abdomen Complete    US Elastography Liver    Comprehensive metabolic panel    CBC auto differential    Hepatitis A antibody, IgG    Hepatitis B surface antibody    Hepatitis B surface antigen    Hepatitis B core antibody, total   2. F/u visit in 4-6 weeks     Amilcar Monson PA-C

## 2018-03-26 NOTE — LETTER
March 26, 2018      Kermit Mccall MD  [address]  [place] [address]           [facility] - Hepatitis C  [address]  [facility] [address]  Phone: [phone]  Fax: [phone]          Patient: Mamadou Meier   MR Number: [id number]   YOB: 1975   Date of Visit: 3/26/2018       Dear Dr. Kermit Mccall:    Thank you for referring Mamadou Meier to me for evaluation. Attached you will find relevant portions of my assessment and plan of care.    If you have questions, please do not hesitate to call me. I look forward to following Mamadou Meier along with you.    Sincerely,    Amilcar Monson PA-C    Enclosure  CC:  No Recipients    If you would like to receive this communication electronically, please contact [email or web address] or [phone] to request more information on [employer] Link access.    For providers and/or their staff who would like to refer a patient to [facility], please contact us through our one-stop-shop provider referral line, [facility] , at [phone].    If you feel you have received this communication in error or would no longer like to receive these types of communications, please e-mail [email or web address]

## 2018-03-27 LAB
HBV CORE AB SERPL QL IA: NEGATIVE
HBV SURFACE AB SER-ACNC: NEGATIVE M[IU]/ML
HBV SURFACE AG SERPL QL IA: NEGATIVE
HEPATITIS A ANTIBODY, IGG: NEGATIVE

## 2018-03-28 ENCOUNTER — TELEPHONE (OUTPATIENT)
Dept: HEPATOLOGY | Facility: CLINIC | Age: 43
End: 2018-03-28

## 2018-03-28 NOTE — TELEPHONE ENCOUNTER
----- Message from Amilcar Monson PA-C sent at 3/28/2018  1:42 PM CDT -----  Please let him know that these labs are stable. He lacks immunity to HAV and HBV so I recommend vaccination. I sent rx to pharmacy  Thanks

## 2018-04-05 ENCOUNTER — TELEPHONE (OUTPATIENT)
Dept: NEUROLOGY | Facility: CLINIC | Age: 43
End: 2018-04-05

## 2018-04-06 ENCOUNTER — HOSPITAL ENCOUNTER (OUTPATIENT)
Facility: HOSPITAL | Age: 43
Discharge: HOME | End: 2018-04-09
Attending: PSYCHIATRY & NEUROLOGY | Admitting: PSYCHIATRY & NEUROLOGY

## 2018-04-06 DIAGNOSIS — G40.219 COMPLEX PARTIAL EPILEPSY WITH GENERALIZATION AND WITH INTRACTABLE EPILEPSY: ICD-10-CM

## 2018-04-06 DIAGNOSIS — G40.219 PARTIAL EPILEPSY, WITH IMPAIRMENT OF CONSCIOUSNESS, WITH INTRACTABLE EPILEPSY: Primary | ICD-10-CM

## 2018-04-06 DIAGNOSIS — R56.9 SEIZURES: ICD-10-CM

## 2018-04-06 DIAGNOSIS — R56.9 SEIZURE: ICD-10-CM

## 2018-04-06 DIAGNOSIS — E78.2 MIXED HYPERLIPIDEMIA: ICD-10-CM

## 2018-04-06 DIAGNOSIS — I10 ESSENTIAL HYPERTENSION: ICD-10-CM

## 2018-04-06 LAB
ALBUMIN SERPL BCP-MCNC: 3.9 G/DL
ALP SERPL-CCNC: 78 U/L
ALT SERPL W/O P-5'-P-CCNC: 18 U/L
ANION GAP SERPL CALC-SCNC: 9 MMOL/L
AST SERPL-CCNC: 18 U/L
BASOPHILS # BLD AUTO: 0.03 K/UL
BASOPHILS NFR BLD: 0.3 %
BILIRUB SERPL-MCNC: 0.2 MG/DL
BUN SERPL-MCNC: 16 MG/DL
CALCIUM SERPL-MCNC: 9.2 MG/DL
CHLORIDE SERPL-SCNC: 102 MMOL/L
CO2 SERPL-SCNC: 27 MMOL/L
CREAT SERPL-MCNC: 1.2 MG/DL
DIFFERENTIAL METHOD: ABNORMAL
EOSINOPHIL # BLD AUTO: 0.1 K/UL
EOSINOPHIL NFR BLD: 1.6 %
ERYTHROCYTE [DISTWIDTH] IN BLOOD BY AUTOMATED COUNT: 14 %
EST. GFR  (AFRICAN AMERICAN): >60 ML/MIN/1.73 M^2
EST. GFR  (NON AFRICAN AMERICAN): >60 ML/MIN/1.73 M^2
GLUCOSE SERPL-MCNC: 95 MG/DL
HCT VFR BLD AUTO: 40.6 %
HGB BLD-MCNC: 14.8 G/DL
IMM GRANULOCYTES # BLD AUTO: 0.02 K/UL
IMM GRANULOCYTES NFR BLD AUTO: 0.2 %
LYMPHOCYTES # BLD AUTO: 2.5 K/UL
LYMPHOCYTES NFR BLD: 29.3 %
MCH RBC QN AUTO: 32 PG
MCHC RBC AUTO-ENTMCNC: 36.5 G/DL
MCV RBC AUTO: 88 FL
MONOCYTES # BLD AUTO: 1.1 K/UL
MONOCYTES NFR BLD: 12.1 %
NEUTROPHILS # BLD AUTO: 4.9 K/UL
NEUTROPHILS NFR BLD: 56.5 %
NRBC BLD-RTO: 0 /100 WBC
PLATELET # BLD AUTO: 319 K/UL
PMV BLD AUTO: 10.9 FL
POTASSIUM SERPL-SCNC: 3.3 MMOL/L
PROT SERPL-MCNC: 7.3 G/DL
RBC # BLD AUTO: 4.62 M/UL
SODIUM SERPL-SCNC: 138 MMOL/L
WBC # BLD AUTO: 8.68 K/UL

## 2018-04-06 PROCEDURE — 93010 ELECTROCARDIOGRAM REPORT: CPT | Mod: ,,, | Performed by: INTERNAL MEDICINE

## 2018-04-06 PROCEDURE — 85025 COMPLETE CBC W/AUTO DIFF WBC: CPT

## 2018-04-06 PROCEDURE — 36415 COLL VENOUS BLD VENIPUNCTURE: CPT

## 2018-04-06 PROCEDURE — 99220 PR INITIAL OBSERVATION CARE,LEVL III: CPT | Mod: ,,, | Performed by: PSYCHIATRY & NEUROLOGY

## 2018-04-06 PROCEDURE — S4991 NICOTINE PATCH NONLEGEND: HCPCS | Performed by: PSYCHIATRY & NEUROLOGY

## 2018-04-06 PROCEDURE — 95951 HC EEG MONITORING/VIDEO RECORD: CPT

## 2018-04-06 PROCEDURE — G0379 DIRECT REFER HOSPITAL OBSERV: HCPCS

## 2018-04-06 PROCEDURE — G0378 HOSPITAL OBSERVATION PER HR: HCPCS

## 2018-04-06 PROCEDURE — 25000003 PHARM REV CODE 250: Performed by: PSYCHIATRY & NEUROLOGY

## 2018-04-06 PROCEDURE — 93005 ELECTROCARDIOGRAM TRACING: CPT

## 2018-04-06 PROCEDURE — 95951 PR EEG MONITORING/VIDEORECORD: CPT | Mod: 26,,, | Performed by: PSYCHIATRY & NEUROLOGY

## 2018-04-06 PROCEDURE — 80053 COMPREHEN METABOLIC PANEL: CPT

## 2018-04-06 RX ORDER — SODIUM CHLORIDE 0.9 % (FLUSH) 0.9 %
3 SYRINGE (ML) INJECTION
Status: DISCONTINUED | OUTPATIENT
Start: 2018-04-06 | End: 2018-04-09 | Stop reason: HOSPADM

## 2018-04-06 RX ORDER — DOCUSATE SODIUM 100 MG/1
100 CAPSULE, LIQUID FILLED ORAL 2 TIMES DAILY PRN
Status: DISCONTINUED | OUTPATIENT
Start: 2018-04-06 | End: 2018-04-09 | Stop reason: HOSPADM

## 2018-04-06 RX ORDER — ONDANSETRON 8 MG/1
8 TABLET, ORALLY DISINTEGRATING ORAL EVERY 8 HOURS PRN
Status: DISCONTINUED | OUTPATIENT
Start: 2018-04-06 | End: 2018-04-09 | Stop reason: HOSPADM

## 2018-04-06 RX ORDER — DIAZEPAM 5 MG/ML
5 INJECTION, SOLUTION INTRAMUSCULAR; INTRAVENOUS EVERY 4 HOURS PRN
Status: DISCONTINUED | OUTPATIENT
Start: 2018-04-06 | End: 2018-04-09 | Stop reason: HOSPADM

## 2018-04-06 RX ORDER — PRAVASTATIN SODIUM 20 MG/1
20 TABLET ORAL DAILY
Status: DISCONTINUED | OUTPATIENT
Start: 2018-04-07 | End: 2018-04-09 | Stop reason: HOSPADM

## 2018-04-06 RX ORDER — ACETAMINOPHEN 325 MG/1
650 TABLET ORAL EVERY 4 HOURS PRN
Status: DISCONTINUED | OUTPATIENT
Start: 2018-04-06 | End: 2018-04-09 | Stop reason: HOSPADM

## 2018-04-06 RX ORDER — TRAMADOL HYDROCHLORIDE 50 MG/1
100 TABLET ORAL ONCE
Status: COMPLETED | OUTPATIENT
Start: 2018-04-07 | End: 2018-04-07

## 2018-04-06 RX ORDER — LOSARTAN POTASSIUM AND HYDROCHLOROTHIAZIDE 25; 100 MG/1; MG/1
1 TABLET ORAL DAILY
Status: DISCONTINUED | OUTPATIENT
Start: 2018-04-07 | End: 2018-04-09 | Stop reason: HOSPADM

## 2018-04-06 RX ORDER — IBUPROFEN 200 MG
1 TABLET ORAL DAILY
Status: DISCONTINUED | OUTPATIENT
Start: 2018-04-06 | End: 2018-04-09 | Stop reason: HOSPADM

## 2018-04-06 RX ORDER — DIPHENHYDRAMINE HCL 50 MG
50 CAPSULE ORAL ONCE
Status: COMPLETED | OUTPATIENT
Start: 2018-04-07 | End: 2018-04-07

## 2018-04-06 RX ORDER — IBUPROFEN 200 MG
1 TABLET ORAL DAILY
Status: DISCONTINUED | OUTPATIENT
Start: 2018-04-07 | End: 2018-04-06

## 2018-04-06 RX ADMIN — NICOTINE 1 PATCH: 21 PATCH, EXTENDED RELEASE TRANSDERMAL at 07:04

## 2018-04-06 NOTE — ASSESSMENT & PLAN NOTE
41 yo male with history of seizures since 2005 who presents as direct admit to EMU for further characterization of events. Not on any home AEDs for over 5 years after running out of refills.     - Start VEEG  - Not on any home AEDs  - Seizure precautions  - Activation procedures per protocol - HV/photic today, sleep deprivation tonight  - IV Valium PRN for GTC greater than 5 min - hospital medicine to call epilepsy on call before administering

## 2018-04-06 NOTE — SUBJECTIVE & OBJECTIVE
Past Medical History:   Diagnosis Date    Epilepsy     Hypertension        Past Surgical History:   Procedure Laterality Date    FRACTURE SURGERY      Right hand       Review of patient's allergies indicates:   Allergen Reactions    No known drug allergies        No current facility-administered medications on file prior to encounter.      Current Outpatient Prescriptions on File Prior to Encounter   Medication Sig    losartan-hydrochlorothiazide 100-25 mg (HYZAAR) 100-25 mg per tablet Take 1 tablet by mouth once daily.    pravastatin (PRAVACHOL) 20 MG tablet Take 1 tablet (20 mg total) by mouth once daily.     Continuous Infusions:    Family History     Problem Relation (Age of Onset)    Anxiety disorder Brother    Heart disease Father    Hypertension Mother    No Known Problems Sister, Brother        Social History Main Topics    Smoking status: Current Every Day Smoker     Packs/day: 1.00     Years: 30.00     Types: Cigarettes    Smokeless tobacco: Never Used    Alcohol use No    Drug use: No    Sexual activity: Yes     Partners: Female     Review of Systems   Constitutional: Negative for chills, fatigue and fever.   HENT: Negative for congestion, rhinorrhea and sore throat.    Respiratory: Negative for cough, shortness of breath and wheezing.    Cardiovascular: Negative for chest pain and palpitations.   Gastrointestinal: Negative for abdominal pain, diarrhea, nausea and vomiting.   Skin: Negative for pallor, rash and wound.   Neurological: Positive for seizures. Negative for dizziness, facial asymmetry, speech difficulty, weakness and headaches.   Psychiatric/Behavioral: Negative for agitation, confusion and hallucinations.     Objective:     Vital Signs (Most Recent):  Temp: 96.8 °F (36 °C) (04/06/18 1416)  Pulse: 85 (04/06/18 1416)  Resp: 16 (04/06/18 1416)  BP: 125/74 (04/06/18 1416)  SpO2: 96 % (04/06/18 1416) Vital Signs (24h Range):  Temp:  [96.8 °F (36 °C)] 96.8 °F (36 °C)  Pulse:  [85]  85  Resp:  [16] 16  SpO2:  [96 %] 96 %  BP: (125)/(74) 125/74        There is no height or weight on file to calculate BMI.    Physical Exam   Constitutional: He is oriented to person, place, and time. He appears well-developed and well-nourished. No distress.   HENT:   Head: Normocephalic and atraumatic.   Right Ear: External ear normal.   Left Ear: External ear normal.   Nose: Nose normal.   Eyes: EOM are normal. Pupils are equal, round, and reactive to light.   Pulmonary/Chest: Effort normal. No respiratory distress.   Musculoskeletal: Normal range of motion. He exhibits no edema.   Neurological: He is alert and oriented to person, place, and time. He has normal strength. He has a normal Finger-Nose-Finger Test.   Reflex Scores:       Patellar reflexes are 2+ on the right side and 2+ on the left side.  Skin: Skin is warm and dry. He is not diaphoretic. No erythema.   Psychiatric: He has a normal mood and affect. His speech is normal and behavior is normal. Judgment and thought content normal.       NEUROLOGICAL EXAMINATION:     MENTAL STATUS   Oriented to person, place, and time.   Attention: normal. Concentration: normal.   Speech: speech is normal   Level of consciousness: alert    CRANIAL NERVES     CN III, IV, VI   Pupils are equal, round, and reactive to light.  Extraocular motions are normal.     CN V   Facial sensation intact.     CN VII   Facial expression full, symmetric.     CN VIII   CN VIII normal.     CN IX, X   CN IX normal.   CN X normal.     CN XI   CN XI normal.     CN XII   CN XII normal.     MOTOR EXAM   Muscle bulk: normal  Overall muscle tone: normal    Strength   Strength 5/5 throughout.     REFLEXES     Reflexes   Right patellar: 2+  Left patellar: 2+    SENSORY EXAM   Light touch normal.   Proprioception normal.     GAIT AND COORDINATION      Coordination   Finger to nose coordination: normal    Tremor   Resting tremor: absent  Intention tremor: absent  Action tremor:  absent      Significant Labs: All pertinent lab results from the past 24 hours have been reviewed.    Significant Studies: I have reviewed all pertinent imaging results/findings within the past 24 hours.

## 2018-04-06 NOTE — ASSESSMENT & PLAN NOTE
- MRI brain completed 2/27/18 showing 1.5 cm fluid signal focus within the pineal fossa most suggestive for pineal cyst

## 2018-04-06 NOTE — HPI
"Mr. Meier is a 41 yo male who presents as direct admit to Epilepsy Monitoring Unit for characterization of seizures. He reports that he believes he began having seizures in 2005, but they were not diagnosed until 2007. Starting in 2005, he states that he would have "blackouts" and would wake up with no idea what was going on or how he got there. 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 He was brought to the hospital at this time and diagnosed with seizures, and was started on dilantin. He states he continued to have these events approximately twice per week, so he was changed to Depakote, but that he had significant side effect of "feeling like he had the flu" with Depakote, so this was discontinued. He was also tried on Tegretol, but reports he did not notice any decrease in seizure frequency. He states his events eventually began to space out to every 4-6 months, and as a result he stopped taking his AED when he ran out of refills, and did not follow up with neurology as he thought he was getting better. He reports an aura prior to his seizures of feeling like sounds around him are muffled, then seeing a light over his left shoulder that travels to the center of his vision, followed by loss of consciousness. Witnesses have described the events as tonic stiffening of his extremities, with some associated generalized convulsions. He has bitten his tongue during events. Afterwards, he is weak, sore, and it takes an unknown period of time for him to be able to answer questions appropriately. He has had a prior MRI brain showing a pineal cyst.     Epilepsy History: Last clinic visit, Dr. Sylvester 2/23/18  This is a 42 y.o. right handed male who presents for evaluation and management     Patient states that he is here today because he is at risk of being taken off social security - as he has not been to a neurologist for several years     Onset of " seizures x 2005  - patient reports that he would wake up at unusual locations - was on the ground once while mowing the lawn and the lawn  was 'out in the woods' - unwitnessed; had bitten his tongue but no other associated features; thought he was having 'blackouts' but did not seek medical attention.   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 Patient previously resided in [place] and after he received his disability in 2009, moved to [facility] in 2010 (not taking AEDs after that time)     Patient reports that he continues to have seizures; previously occurred -12 per week but now occurs q 2-3 months intermittently (no definite frequency)  - currently he has an aura: feels lightheaded, followed by a light appearing on the left side of visual field that expands to the center followed by LOC. When he returns to awareness, reports post-event confusion, occasionally has tongue bite; no hx of b/b incontinence at any time. Reports generalized body soreness x ~3 days  Per witnesses: patient's eyes roll back, teeth grinding with drooling and generalized stiffening      Triggers for seizures: social/personal stress; sleep deprivation;           The last generalized motor seizure was  9/17     The longest seizure-free interval: 6 months (~2 years ago)     There is no history of status epilepticus.    There is  history of physical injury as a result of seizures: falls     Any other relevant  information:  - none     PREVIOUS EVALUATIONS:    Previous EEGs: reports EEG done possibly at onset - unsure     CT Head:      Results for orders placed or performed during the hospital encounter of 09/22/12   CT Head Without Contrast     Narrative     DATE OF EXAM: Sep 22 2012   WCT   0027  -  CT HEAD WITHOUT CONTRAST:     [id number]     CLINICAL HISTORY:   HEAD TRAUMA     ICD 9 CODE(S):   ()  CPT 4 CODE(S)/MODIFIER(S):   ()     Technique: Serial transaxial scans of the brain were obtained from the   skull base to the vertex. No priors are available for comparison.     Findings:      The ventricles and sulci are normal.  There is no evidence for acute or   subacute ischemia.  No intracranial hemorrhage or extra-axial blood or   fluid is present.  There is mild soft tissue swelling overlying the left   superior convexity.  There is a 16mm cystic pineal mass with dense   marginal dystrophic calcifications.  No surrounding edema is present.     The orbits, paranasal sinuses and mastoid air cells are clear.  No   fracture or destructive osseous lesion is seen.        Impression:     1.  No posttraumatic abnormality, specifically no evidence for fracture   or hemorrhage.  Mild soft tissue swelling overlying the left cerebral   convexity     2.  16mm cystic pineal mass in which further characterization with   contrast enhanced MR is recommended.  Considerations include pineocytoma   as well pineoblastoma.     Preliminary report given by virtual radiologic at 4:42 a.m. 09/22/12  ______________________________________      Electronically signed by: Scott Lara MD  Date:     09/22/12  Time:    06:53            : JOANA  Transcribe Date/Time: Sep 22 2012  6:53A  Dictated by : SCOTT LARA MD  Read On:      Images were reviewed, findings were verified and document was   electronically  SIGNED BY: SCOTT LARA MD On: Sep 22 2012  6:53A         Additional tests:  1)CT Scan: no   2) EEG\Video Monitoring: no   3)  PET Scan: no  4) Neuropsychological evaluation: no  5) DEXA Scan: no  6) Others: no     RISK FACTORS FOR SEIZURES:    1. Head Trauma:  Yes  - accidental trauma from a fall at age 7 years; no hx of LOC  2. CNS Infections:  No  3. CNS Tumors: No     4. CNS Vascular Disease: No     5. Febrile Seizures: No    6. Developmental Delay: No       7. Family History of Seizures: Yes; niece has seizures x early adulthood (on meds) - no details    8. Birth history: FTNVD     Pregnancy/Labor/Delivery: n/a     CURRENT MEDICATIONS:   Current Medications          Current Outpatient Prescriptions   Medication Sig Dispense Refill    losartan-hydrochlorothiazide 100-25 mg (HYZAAR) 100-25 mg per tablet Take 1 tablet by mouth once daily. 30 tablet 5    sertraline (ZOLOFT) 50 MG tablet Take 1 tablet (50 mg total) by mouth once daily. 30 tablet 11      No current facility-administered medications for this visit.          Folic acid: no     CURRENT ANTI EPILEPTIC MEDICATIONS:  none     VAGAL NERVE STIMULATOR: n/a     PRIOR ANTICONVULSANT HISTORY:   1) Carbamazepine (Tegretol, CBZ): used and beneficial - did not resolve completely; but stopped taking it  2) Phenytoin (Dilantin, PHT): used but not effective  3) Valproic acid (Depakote, VPA): used but not effective

## 2018-04-06 NOTE — H&P
"[facility]  Neurology-Epilepsy  History & Physical    Patient Name: Mamadou Meier  MRN: [id number]   Admission Date: 4/6/2018  Code Status: Full Code   Attending Provider: SABI Simms MD   Primary Care Physician: Kermit Mccall MD  Principal Problem:Complex partial epilepsy with generalization and with intractable epilepsy    Subjective:     Chief Complaint:  seizures     HPI:   Mr. Meier is a 43 yo male who presents as direct admit to Epilepsy Monitoring Unit for characterization of seizures. He reports that he believes he began having seizures in 2005, but they were not diagnosed until 2007. Starting in 2005, he states that he would have "blackouts" and would wake up with no idea what was going on or how he got there. 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 He was brought to the hospital at this time and diagnosed with seizures, and was started on dilantin. He states he continued to have these events approximately twice per week, so he was changed to Depakote, but that he had significant side effect of "feeling like he had the flu" with Depakote, so this was discontinued. He was also tried on Tegretol, but reports he did not notice any decrease in seizure frequency. He states his events eventually began to space out to every 4-6 months, and as a result he stopped taking his AED when he ran out of refills, and did not follow up with neurology as he thought he was getting better. He reports an aura prior to his seizures of feeling like sounds around him are muffled, then seeing a light over his left shoulder that travels to the center of his vision, followed by loss of consciousness. Witnesses have described the events as tonic stiffening of his extremities, with some associated generalized convulsions. He has bitten his tongue during events. Afterwards, he is weak, sore, and it takes an unknown period of time for him to " be able to answer questions appropriately. He has had a prior MRI brain showing a pineal cyst.     Epilepsy History: Last clinic visit, Dr. Sylvester 2/23/18  This is a 42 y.o. right handed male who presents for evaluation and management     Patient states that he is here today because he is at risk of being taken off social security - as he has not been to a neurologist for several years     Onset of seizures x 2005  - patient reports that he would wake up at unusual locations - was on the ground once while mowing the lawn and the lawn  was 'out in the woods' - unwitnessed; had bitten his tongue but no other associated features; thought he was having 'blackouts' but did not seek medical attention.   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 Patient previously resided in [place] and after he received his disability in 2009, moved to [facility] in 2010 (not taking AEDs after that time)     Patient reports that he continues to have seizures; previously occurred -12 per week but now occurs q 2-3 months intermittently (no definite frequency)  - currently he has an aura: feels lightheaded, followed by a light appearing on the left side of visual field that expands to the center followed by LOC. When he returns to awareness, reports post-event confusion, occasionally has tongue bite; no hx of b/b incontinence at any time. Reports generalized body soreness x ~3 days  Per witnesses: patient's eyes roll  back, teeth grinding with drooling and generalized stiffening      Triggers for seizures: social/personal stress; sleep deprivation;           The last generalized motor seizure was  9/17     The longest seizure-free interval: 6 months (~2 years ago)     There is no history of status epilepticus.    There is  history of physical injury as a result of seizures: falls     Any other relevant information:  - none     PREVIOUS EVALUATIONS:    Previous EEGs: reports EEG done possibly at onset - unsure     CT Head:      Results for orders placed or performed during the hospital encounter of 09/22/12   CT Head Without Contrast     Narrative     DATE OF EXAM: Sep 22 2012   WCT   0027  -  CT HEAD WITHOUT CONTRAST:     [id number]     CLINICAL HISTORY:   HEAD TRAUMA     ICD 9 CODE(S):   ()  CPT 4 CODE(S)/MODIFIER(S):   ()     Technique: Serial transaxial scans of the brain were obtained from the   skull base to the vertex. No priors are available for comparison.     Findings:      The ventricles and sulci are normal.  There is no evidence for acute or   subacute ischemia.  No intracranial hemorrhage or extra-axial blood or   fluid is present.  There is mild soft tissue swelling overlying the left   superior convexity.  There is a 16mm cystic pineal mass with dense   marginal dystrophic calcifications.  No surrounding edema is present.     The orbits, paranasal sinuses and mastoid air cells are clear.  No   fracture or destructive osseous lesion is seen.        Impression:     1.  No posttraumatic abnormality, specifically no evidence for fracture   or hemorrhage.  Mild soft tissue swelling overlying the left cerebral   convexity     2.  16mm cystic pineal mass in which further characterization with   contrast enhanced MR is recommended.  Considerations include pineocytoma   as well pineoblastoma.     Preliminary report given by virtual radiologic at 4:42 a.m. 09/22/12  ______________________________________      Electronically  signed by: Scott Lara MD  Date:     09/22/12  Time:    06:53            : JOANA  Transcribe Date/Time: Sep 22 2012  6:53A  Dictated by : SCOTT LARA MD  Read On:      Images were reviewed, findings were verified and document was   electronically  SIGNED BY: SCOTT LARA MD On: Sep 22 2012  6:53A         Additional tests:  1)CT Scan: no   2) EEG\Video Monitoring: no   3) PET Scan: no  4) Neuropsychological evaluation: no  5) DEXA Scan: no  6) Others: no     RISK FACTORS FOR SEIZURES:    1. Head Trauma:  Yes  - accidental trauma from a fall at age 7 years; no hx of LOC  2. CNS Infections:  No  3. CNS Tumors: No     4. CNS Vascular Disease: No     5. Febrile Seizures: No    6. Developmental Delay: No       7. Family History of Seizures: Yes; niece has seizures x early adulthood (on meds) - no details    8. Birth history: FTNVD     Pregnancy/Labor/Delivery: n/a     CURRENT MEDICATIONS:   Current Medications          Current Outpatient Prescriptions   Medication Sig Dispense Refill    losartan-hydrochlorothiazide 100-25 mg (HYZAAR) 100-25 mg per tablet Take 1 tablet by mouth once daily. 30 tablet 5    sertraline (ZOLOFT) 50 MG tablet Take 1 tablet (50 mg total) by mouth once daily. 30 tablet 11      No current facility-administered medications for this visit.          Folic acid: no     CURRENT ANTI EPILEPTIC MEDICATIONS:  none     VAGAL NERVE STIMULATOR: n/a     PRIOR ANTICONVULSANT HISTORY:   1) Carbamazepine (Tegretol, CBZ): used and beneficial - did not resolve completely; but stopped taking it  2) Phenytoin (Dilantin, PHT): used but not effective  3) Valproic acid (Depakote, VPA): used but not effective      Past Medical History:   Diagnosis Date    Epilepsy     Hypertension        Past Surgical History:   Procedure Laterality Date    FRACTURE SURGERY      Right hand       Review of patient's allergies indicates:   Allergen Reactions    No known drug allergies        No current  facility-administered medications on file prior to encounter.      Current Outpatient Prescriptions on File Prior to Encounter   Medication Sig    losartan-hydrochlorothiazide 100-25 mg (HYZAAR) 100-25 mg per tablet Take 1 tablet by mouth once daily.    pravastatin (PRAVACHOL) 20 MG tablet Take 1 tablet (20 mg total) by mouth once daily.     Continuous Infusions:    Family History     Problem Relation (Age of Onset)    Anxiety disorder Brother    Heart disease Father    Hypertension Mother    No Known Problems Sister, Brother        Social History Main Topics    Smoking status: Current Every Day Smoker     Packs/day: 1.00     Years: 30.00     Types: Cigarettes    Smokeless tobacco: Never Used    Alcohol use No    Drug use: No    Sexual activity: Yes     Partners: Female     Review of Systems   Constitutional: Negative for chills, fatigue and fever.   HENT: Negative for congestion, rhinorrhea and sore throat.    Respiratory: Negative for cough, shortness of breath and wheezing.    Cardiovascular: Negative for chest pain and palpitations.   Gastrointestinal: Negative for abdominal pain, diarrhea, nausea and vomiting.   Skin: Negative for pallor, rash and wound.   Neurological: Positive for seizures. Negative for dizziness, facial asymmetry, speech difficulty, weakness and headaches.   Psychiatric/Behavioral: Negative for agitation, confusion and hallucinations.     Objective:     Vital Signs (Most Recent):  Temp: 96.8 °F (36 °C) (04/06/18 1416)  Pulse: 85 (04/06/18 1416)  Resp: 16 (04/06/18 1416)  BP: 125/74 (04/06/18 1416)  SpO2: 96 % (04/06/18 1416) Vital Signs (24h Range):  Temp:  [96.8 °F (36 °C)] 96.8 °F (36 °C)  Pulse:  [85] 85  Resp:  [16] 16  SpO2:  [96 %] 96 %  BP: (125)/(74) 125/74        There is no height or weight on file to calculate BMI.    Physical Exam   Constitutional: He is oriented to person, place, and time. He appears well-developed and well-nourished. No distress.   HENT:   Head:  Normocephalic and atraumatic.   Right Ear: External ear normal.   Left Ear: External ear normal.   Nose: Nose normal.   Eyes: EOM are normal. Pupils are equal, round, and reactive to light.   Pulmonary/Chest: Effort normal. No respiratory distress.   Musculoskeletal: Normal range of motion. He exhibits no edema.   Neurological: He is alert and oriented to person, place, and time. He has normal strength. He has a normal Finger-Nose-Finger Test.   Reflex Scores:       Patellar reflexes are 2+ on the right side and 2+ on the left side.  Skin: Skin is warm and dry. He is not diaphoretic. No erythema.   Psychiatric: He has a normal mood and affect. His speech is normal and behavior is normal. Judgment and thought content normal.       NEUROLOGICAL EXAMINATION:     MENTAL STATUS   Oriented to person, place, and time.   Attention: normal. Concentration: normal.   Speech: speech is normal   Level of consciousness: alert    CRANIAL NERVES     CN III, IV, VI   Pupils are equal, round, and reactive to light.  Extraocular motions are normal.     CN V   Facial sensation intact.     CN VII   Facial expression full, symmetric.     CN VIII   CN VIII normal.     CN IX, X   CN IX normal.   CN X normal.     CN XI   CN XI normal.     CN XII   CN XII normal.     MOTOR EXAM   Muscle bulk: normal  Overall muscle tone: normal    Strength   Strength 5/5 throughout.     REFLEXES     Reflexes   Right patellar: 2+  Left patellar: 2+    SENSORY EXAM   Light touch normal.   Proprioception normal.     GAIT AND COORDINATION      Coordination   Finger to nose coordination: normal    Tremor   Resting tremor: absent  Intention tremor: absent  Action tremor: absent      Significant Labs: All pertinent lab results from the past 24 hours have been reviewed.    Significant Studies: I have reviewed all pertinent imaging results/findings within the past 24 hours.    Assessment and Plan:     * Complex partial epilepsy with generalization and with  intractable epilepsy    41 yo male with history of seizures since 2005 who presents as direct admit to EMU for further characterization of events. Not on any home AEDs for over 5 years after running out of refills.     - Start VEEG  - Not on any home AEDs  - Seizure precautions  - Activation procedures per protocol - HV/photic today, sleep deprivation tonight  - IV Valium PRN for GTC greater than 5 min - hospital medicine to call epilepsy on call before administering          Mixed hyperlipidemia, risk factors smoker, HTN    - Continue home pravachol        Pineal gland cyst    - MRI brain completed 2/27/18 showing 1.5 cm fluid signal focus within the pineal fossa most suggestive for pineal cyst        Hypertension    - Continue home Hyzaar            VTE Risk Mitigation         Ordered     Place JAD hose  Until discontinued      04/06/18 1557     Place sequential compression device  Until discontinued      04/06/18 1557     IP VTE LOW RISK PATIENT  Once      04/06/18 1557          Corinne Holloway PA-C  Neurology-Epilepsy  [facility]  Staff: Dr. Simms

## 2018-04-07 LAB
AMPHET+METHAMPHET UR QL: NEGATIVE
BARBITURATES UR QL SCN>200 NG/ML: NEGATIVE
BENZODIAZ UR QL SCN>200 NG/ML: NEGATIVE
BZE UR QL SCN: NEGATIVE
CANNABINOIDS UR QL SCN: NEGATIVE
CREAT UR-MCNC: 46 MG/DL
METHADONE UR QL SCN>300 NG/ML: NEGATIVE
OPIATES UR QL SCN: NEGATIVE
PCP UR QL SCN>25 NG/ML: NEGATIVE
TOXICOLOGY INFORMATION: NORMAL

## 2018-04-07 PROCEDURE — 95951 PR EEG MONITORING/VIDEORECORD: CPT | Mod: 26,,, | Performed by: PSYCHIATRY & NEUROLOGY

## 2018-04-07 PROCEDURE — 95951 HC EEG MONITORING/VIDEO RECORD: CPT

## 2018-04-07 PROCEDURE — 25000003 PHARM REV CODE 250: Performed by: PSYCHIATRY & NEUROLOGY

## 2018-04-07 PROCEDURE — S4991 NICOTINE PATCH NONLEGEND: HCPCS | Performed by: PSYCHIATRY & NEUROLOGY

## 2018-04-07 PROCEDURE — 25000003 PHARM REV CODE 250: Performed by: PHYSICIAN ASSISTANT

## 2018-04-07 PROCEDURE — 80307 DRUG TEST PRSMV CHEM ANLYZR: CPT

## 2018-04-07 PROCEDURE — G0378 HOSPITAL OBSERVATION PER HR: HCPCS

## 2018-04-07 RX ADMIN — LOSARTAN POTASSIUM AND HYDROCHLOROTHIAZIDE 1 TABLET: 25; 100 TABLET ORAL at 09:04

## 2018-04-07 RX ADMIN — DIPHENHYDRAMINE HYDROCHLORIDE 50 MG: 50 CAPSULE ORAL at 06:04

## 2018-04-07 RX ADMIN — PRAVASTATIN SODIUM 20 MG: 20 TABLET ORAL at 09:04

## 2018-04-07 RX ADMIN — NICOTINE 1 PATCH: 21 PATCH, EXTENDED RELEASE TRANSDERMAL at 09:04

## 2018-04-07 RX ADMIN — TRAMADOL HYDROCHLORIDE 100 MG: 50 TABLET, COATED ORAL at 06:04

## 2018-04-08 PROCEDURE — S4991 NICOTINE PATCH NONLEGEND: HCPCS | Performed by: PSYCHIATRY & NEUROLOGY

## 2018-04-08 PROCEDURE — 95951 PR EEG MONITORING/VIDEORECORD: CPT | Mod: 26,52,, | Performed by: PSYCHIATRY & NEUROLOGY

## 2018-04-08 PROCEDURE — 25000003 PHARM REV CODE 250: Performed by: PSYCHIATRY & NEUROLOGY

## 2018-04-08 PROCEDURE — 95951 HC EEG MONITORING/VIDEO RECORD: CPT

## 2018-04-08 PROCEDURE — G0378 HOSPITAL OBSERVATION PER HR: HCPCS

## 2018-04-08 PROCEDURE — 25000003 PHARM REV CODE 250: Performed by: PHYSICIAN ASSISTANT

## 2018-04-08 RX ORDER — TRAMADOL HYDROCHLORIDE 50 MG/1
100 TABLET ORAL ONCE
Status: COMPLETED | OUTPATIENT
Start: 2018-04-09 | End: 2018-04-09

## 2018-04-08 RX ORDER — DIPHENHYDRAMINE HCL 50 MG
50 CAPSULE ORAL ONCE
Status: COMPLETED | OUTPATIENT
Start: 2018-04-09 | End: 2018-04-09

## 2018-04-08 RX ADMIN — NICOTINE 1 PATCH: 21 PATCH, EXTENDED RELEASE TRANSDERMAL at 08:04

## 2018-04-08 RX ADMIN — LOSARTAN POTASSIUM AND HYDROCHLOROTHIAZIDE 1 TABLET: 25; 100 TABLET ORAL at 08:04

## 2018-04-08 RX ADMIN — PRAVASTATIN SODIUM 20 MG: 20 TABLET ORAL at 08:04

## 2018-04-08 NOTE — PLAN OF CARE
Problem: Fall Risk (Adult)  Goal: Identify Related Risk Factors and Signs and Symptoms  Related risk factors and signs and symptoms are identified upon initiation of Human Response Clinical Practice Guideline (CPG)   Outcome: Ongoing (interventions implemented as appropriate)  Pt will remain free of falls during stay.

## 2018-04-08 NOTE — PLAN OF CARE
Problem: Patient Care Overview  Goal: Plan of Care Review  Outcome: Ongoing (interventions implemented as appropriate)  Plan of care discussed with pt and mother. Questions and concerns addressed. VSS and neuro WNL. No significant events noted this shift. No distress or pain noted this shift. Call light in reach. Fall and seizure precautions in place. Will cont to monitor.

## 2018-04-09 VITALS
SYSTOLIC BLOOD PRESSURE: 145 MMHG | HEIGHT: 70 IN | OXYGEN SATURATION: 95 % | TEMPERATURE: 98 F | DIASTOLIC BLOOD PRESSURE: 92 MMHG | RESPIRATION RATE: 17 BRPM | HEART RATE: 67 BPM | BODY MASS INDEX: 24.37 KG/M2 | WEIGHT: 170.19 LBS

## 2018-04-09 PROCEDURE — 25000003 PHARM REV CODE 250: Performed by: PSYCHIATRY & NEUROLOGY

## 2018-04-09 PROCEDURE — S4991 NICOTINE PATCH NONLEGEND: HCPCS | Performed by: PSYCHIATRY & NEUROLOGY

## 2018-04-09 PROCEDURE — 99217 PR OBSERVATION CARE DISCHARGE: CPT | Mod: ,,, | Performed by: PSYCHIATRY & NEUROLOGY

## 2018-04-09 PROCEDURE — G0378 HOSPITAL OBSERVATION PER HR: HCPCS

## 2018-04-09 PROCEDURE — 25000003 PHARM REV CODE 250: Performed by: PHYSICIAN ASSISTANT

## 2018-04-09 RX ORDER — ZONISAMIDE 100 MG/1
300 CAPSULE ORAL NIGHTLY
Qty: 90 CAPSULE | Refills: 5 | Status: SHIPPED | OUTPATIENT
Start: 2018-04-09 | End: 2019-04-09

## 2018-04-09 RX ADMIN — TRAMADOL HYDROCHLORIDE 100 MG: 50 TABLET, FILM COATED ORAL at 05:04

## 2018-04-09 RX ADMIN — LOSARTAN POTASSIUM AND HYDROCHLOROTHIAZIDE 1 TABLET: 25; 100 TABLET ORAL at 09:04

## 2018-04-09 RX ADMIN — DIPHENHYDRAMINE HYDROCHLORIDE 50 MG: 50 CAPSULE ORAL at 05:04

## 2018-04-09 RX ADMIN — NICOTINE 1 PATCH: 21 PATCH, EXTENDED RELEASE TRANSDERMAL at 09:04

## 2018-04-09 RX ADMIN — PRAVASTATIN SODIUM 20 MG: 20 TABLET ORAL at 09:04

## 2018-04-09 NOTE — SUBJECTIVE & OBJECTIVE
Interval History: No events during admission. Requesting discharge home today.     Current Facility-Administered Medications   Medication Dose Route Frequency Provider Last Rate Last Dose    acetaminophen tablet 650 mg  650 mg Oral Q4H PRN Corinne Holloway PA-C        diazePAM injection 5 mg  5 mg Intravenous Q4H PRN Corinne Holloway PA-C        docusate sodium capsule 100 mg  100 mg Oral BID PRN Corinne Holloway PA-C        losartan-hydrochlorothiazide 100-25 mg per tablet 1 tablet  1 tablet Oral Daily Corinne Holloway PA-C   1 tablet at 04/09/18 0907    nicotine 21 mg/24 hr 1 patch  1 patch Transdermal Daily R Benny Simms MD   1 patch at 04/09/18 0906    ondansetron disintegrating tablet 8 mg  8 mg Oral Q8H PRN Corinne Holloway PA-C        pravastatin tablet 20 mg  20 mg Oral Daily Corinne Holloway PA-C   20 mg at 04/09/18 0907    sodium chloride 0.9% flush 3 mL  3 mL Intravenous PRN Corinne Holloway PA-C         Current Outpatient Prescriptions   Medication Sig Dispense Refill    losartan-hydrochlorothiazide 100-25 mg (HYZAAR) 100-25 mg per tablet Take 1 tablet by mouth once daily. 30 tablet 5    pravastatin (PRAVACHOL) 20 MG tablet Take 1 tablet (20 mg total) by mouth once daily. 90 tablet 1    zonisamide (ZONEGRAN) 100 MG Cap Take 3 capsules (300 mg total) by mouth every evening. 90 capsule 5     Continuous Infusions:    Review of Systems   Constitutional: Negative for chills, fatigue and fever.   Respiratory: Negative for cough and shortness of breath.    Cardiovascular: Negative for chest pain and palpitations.   Gastrointestinal: Negative for abdominal pain, nausea and vomiting.   Neurological: Positive for seizures. Negative for dizziness, facial asymmetry, speech difficulty, weakness and headaches.   Psychiatric/Behavioral: Negative for agitation, confusion and hallucinations.     Objective:     Vital Signs (Most Recent):  Temp: 98 °F (36.7 °C) (04/09/18 1134)  Pulse: 67 (04/09/18  1134)  Resp: 17 (04/09/18 1134)  BP: (!) 145/92 (04/09/18 1134)  SpO2: 95 % (04/09/18 1134) Vital Signs (24h Range):  Temp:  [97.8 °F (36.6 °C)-98.8 °F (37.1 °C)] 98 °F (36.7 °C)  Pulse:  [62-79] 67  Resp:  [17-20] 17  SpO2:  [94 %-98 %] 95 %  BP: (118-145)/(66-93) 145/92     Weight: 77.2 kg (170 lb 3.2 oz)  Body mass index is 24.42 kg/m².    Physical Exam   Constitutional: He is oriented to person, place, and time. He appears well-developed and well-nourished. No distress.   HENT:   Head: Normocephalic and atraumatic.   Eyes: EOM are normal. Pupils are equal, round, and reactive to light.   Pulmonary/Chest: Effort normal. No respiratory distress.   Musculoskeletal: Normal range of motion. He exhibits no edema.   Neurological: He is alert and oriented to person, place, and time. He has normal strength. He has a normal Finger-Nose-Finger Test.   Skin: Skin is warm and dry. He is not diaphoretic. No erythema.   Psychiatric: He has a normal mood and affect. His speech is normal and behavior is normal. Judgment and thought content normal.       NEUROLOGICAL EXAMINATION:     MENTAL STATUS   Oriented to person, place, and time.   Attention: normal. Concentration: normal.   Speech: speech is normal   Level of consciousness: alert    CRANIAL NERVES     CN III, IV, VI   Pupils are equal, round, and reactive to light.  Extraocular motions are normal.     CN V   Facial sensation intact.     CN VII   Facial expression full, symmetric.     CN VIII   CN VIII normal.     CN IX, X   CN IX normal.   CN X normal.     CN XI   CN XI normal.     CN XII   CN XII normal.     MOTOR EXAM   Muscle bulk: normal  Overall muscle tone: normal    Strength   Strength 5/5 throughout.     SENSORY EXAM   Light touch normal.   Proprioception normal.     GAIT AND COORDINATION      Coordination   Finger to nose coordination: normal    Tremor   Resting tremor: absent  Intention tremor: absent  Action tremor: absent      Significant Labs: All pertinent  lab results from the past 24 hours have been reviewed.    Significant Studies: I have reviewed all pertinent imaging results/findings within the past 24 hours.

## 2018-04-09 NOTE — PLAN OF CARE
04/09/18 1154   Final Note   Assessment Type Final Discharge Note   Discharge Disposition Home     Patient is discharged to home today with no discharge needs. Patient's mother will provide transportation home.

## 2018-04-09 NOTE — DISCHARGE INSTRUCTIONS
Please take Zonisamide 100 mg (1 tablet) nightly for one week, then take 200 mg (2 tablets) nightly for one week, then take 300 mg (3 tablets) nightly and stay on this dose

## 2018-04-09 NOTE — NURSING
Discharge instructions were given to the patient and his wife. Received verbal feedback that the directions were understood and they have no other questions at this time. IV removed intact and patient tolerated well. Patient took all belongings home. Discharged home with wife driving patient home.    Telemetry removed. Patient reminded to remove Nicotine patch prior to smoking.

## 2018-04-09 NOTE — PROGRESS NOTES
[facility]  Neurology-Epilepsy  Progress Note    Patient Name: Mamadou Meier  MRN: [id number]  Admission Date: 2018  Hospital Length of Stay: 0 days  Code Status: Full Code   Attending Provider: No att. providers found  Primary Care Physician: Kermit Mccall MD   Principal Problem:Complex partial epilepsy with generalization and with intractable epilepsy    Subjective:     Hospital Course:   41 yo male admitted to Epilepsy Monitoring Unit for further characterization of seizure episodes consisting of spacing out and episodes of LOC with tonic limb extension. Has not been on any AEDs for 5+ years.    EE/5-: No events, EEG normal    Patient requesting discharge home . No events captured during admission, but given patients reports history AED is indicated at this time. Will start Zonisamide with plan to taper up to 300 mg qHS. Stable for discharge home today. Appointment scheduled to follow up with Dr. Sylvester in May 2018, with plan to check Zonisamide level around this time for further dose adjustment as needed.     Interval History: No events during admission. Requesting discharge home today.     Current Facility-Administered Medications   Medication Dose Route Frequency Provider Last Rate Last Dose    acetaminophen tablet 650 mg  650 mg Oral Q4H PRN CHINEDU Hester-EDD        diazePAM injection 5 mg  5 mg Intravenous Q4H PRN Corinne Holloway PA-C        docusate sodium capsule 100 mg  100 mg Oral BID PRN Corinne Holloway PA-C        losartan-hydrochlorothiazide 100-25 mg per tablet 1 tablet  1 tablet Oral Daily Corinne Holloway PA-C   1 tablet at 18 09    nicotine 21 mg/24 hr 1 patch  1 patch Transdermal Daily R Benny Simms MD   1 patch at 18 09    ondansetron disintegrating tablet 8 mg  8 mg Oral Q8H PRN CHINEDU Hester-EDD        pravastatin tablet 20 mg  20 mg Oral Daily Corinne Holloway PA-C   20 mg at 18 09    sodium chloride 0.9% flush 3 mL   3 mL Intravenous PRN Corinne Holloway PA-C         Current Outpatient Prescriptions   Medication Sig Dispense Refill    losartan-hydrochlorothiazide 100-25 mg (HYZAAR) 100-25 mg per tablet Take 1 tablet by mouth once daily. 30 tablet 5    pravastatin (PRAVACHOL) 20 MG tablet Take 1 tablet (20 mg total) by mouth once daily. 90 tablet 1    zonisamide (ZONEGRAN) 100 MG Cap Take 3 capsules (300 mg total) by mouth every evening. 90 capsule 5     Continuous Infusions:    Review of Systems   Constitutional: Negative for chills, fatigue and fever.   Respiratory: Negative for cough and shortness of breath.    Cardiovascular: Negative for chest pain and palpitations.   Gastrointestinal: Negative for abdominal pain, nausea and vomiting.   Neurological: Positive for seizures. Negative for dizziness, facial asymmetry, speech difficulty, weakness and headaches.   Psychiatric/Behavioral: Negative for agitation, confusion and hallucinations.     Objective:     Vital Signs (Most Recent):  Temp: 98 °F (36.7 °C) (04/09/18 1134)  Pulse: 67 (04/09/18 1134)  Resp: 17 (04/09/18 1134)  BP: (!) 145/92 (04/09/18 1134)  SpO2: 95 % (04/09/18 1134) Vital Signs (24h Range):  Temp:  [97.8 °F (36.6 °C)-98.8 °F (37.1 °C)] 98 °F (36.7 °C)  Pulse:  [62-79] 67  Resp:  [17-20] 17  SpO2:  [94 %-98 %] 95 %  BP: (118-145)/(66-93) 145/92     Weight: 77.2 kg (170 lb 3.2 oz)  Body mass index is 24.42 kg/m².    Physical Exam   Constitutional: He is oriented to person, place, and time. He appears well-developed and well-nourished. No distress.   HENT:   Head: Normocephalic and atraumatic.   Eyes: EOM are normal. Pupils are equal, round, and reactive to light.   Pulmonary/Chest: Effort normal. No respiratory distress.   Musculoskeletal: Normal range of motion. He exhibits no edema.   Neurological: He is alert and oriented to person, place, and time. He has normal strength. He has a normal Finger-Nose-Finger Test.   Skin: Skin is warm and dry. He is not  diaphoretic. No erythema.   Psychiatric: He has a normal mood and affect. His speech is normal and behavior is normal. Judgment and thought content normal.       NEUROLOGICAL EXAMINATION:     MENTAL STATUS   Oriented to person, place, and time.   Attention: normal. Concentration: normal.   Speech: speech is normal   Level of consciousness: alert    CRANIAL NERVES     CN III, IV, VI   Pupils are equal, round, and reactive to light.  Extraocular motions are normal.     CN V   Facial sensation intact.     CN VII   Facial expression full, symmetric.     CN VIII   CN VIII normal.     CN IX, X   CN IX normal.   CN X normal.     CN XI   CN XI normal.     CN XII   CN XII normal.     MOTOR EXAM   Muscle bulk: normal  Overall muscle tone: normal    Strength   Strength 5/5 throughout.     SENSORY EXAM   Light touch normal.   Proprioception normal.     GAIT AND COORDINATION      Coordination   Finger to nose coordination: normal    Tremor   Resting tremor: absent  Intention tremor: absent  Action tremor: absent      Significant Labs: All pertinent lab results from the past 24 hours have been reviewed.    Significant Studies: I have reviewed all pertinent imaging results/findings within the past 24 hours.    Assessment and Plan:     * Complex partial epilepsy with generalization and with intractable epilepsy    41 yo male with history of seizures since 2005 who presents as direct admit to EMU for further characterization of events. Not on any home AEDs for over 5 years after running out of refills.     - Discontinue VEEG  - Not on any home AEDs  - Patient requesting discharge home 4/9. No events captured during admission, but given patients reports history AED is indicated at this time.   - Start Zonisamide with plan to taper up to 300 mg qHS.   - Stable for discharge home today.   - Appointment scheduled to follow up with Dr. Sylvester in May 2018, with plan to check Zonisamide level around this time for further dose adjustment as  needed.         Mixed hyperlipidemia, risk factors smoker, HTN    - Continue home pravachol        Pineal gland cyst    - MRI brain completed 2/27/18 showing 1.5 cm fluid signal focus within the pineal fossa most suggestive for pineal cyst        Hypertension    - Continue home Hyzaar            VTE Risk Mitigation         Ordered     Place JAD hose  Until discontinued      04/06/18 1557     Place sequential compression device  Until discontinued      04/06/18 1557     IP VTE LOW RISK PATIENT  Once      04/06/18 1557          Corinne Holloway PA-C  Neurology-Epilepsy  [facility]  Staff: Dr. Nicole

## 2018-04-09 NOTE — DISCHARGE SUMMARY
"[facility]  Neurology-Epilepsy  Discharge Summary      Patient Name: Mamadou Meier  MRN: [id number]  Admission Date: 4/6/2018  Hospital Length of Stay: 0 days  Discharge Date and Time:  04/09/2018 2:56 PM  Attending Physician: No att. providers found   Discharging Provider: Corinne Holloway PA-C  Primary Care Physician: Kermit Mccall MD    HPI:   Mr. Meier is a 41 yo male who presents as direct admit to Epilepsy Monitoring Unit for characterization of seizures. He reports that he believes he began having seizures in 2005, but they were not diagnosed until 2007. Starting in 2005, he states that he would have "blackouts" and would wake up with no idea what was going on or how he got there. 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 He was brought to the hospital at this time and diagnosed with seizures, and was started on dilantin. He states he continued to have these events approximately twice per week, so he was changed to Depakote, but that he had significant side effect of "feeling like he had the flu" with Depakote, so this was discontinued. He was also tried on Tegretol, but reports he did not notice any decrease in seizure frequency. He states his events eventually began to space out to every 4-6 months, and as a result he stopped taking his AED when he ran out of refills, and did not follow up with neurology as he thought he was getting better. He reports an aura prior to his seizures of feeling like sounds around him are muffled, then seeing a light over his left shoulder that travels to the center of his vision, followed by loss of consciousness. Witnesses have described the events as tonic stiffening of his extremities, with some associated generalized convulsions. He has bitten his tongue during events. Afterwards, he is weak, sore, and it takes an unknown period of time for him to be able to answer questions " appropriately. He has had a prior MRI brain showing a pineal cyst.     Epilepsy History: Last clinic visit, Dr. Sylvester 2/23/18  This is a 42 y.o. right handed male who presents for evaluation and management     Patient states that he is here today because he is at risk of being taken off social security - as he has not been to a neurologist for several years     Onset of seizures x 2005  - patient reports that he would wake up at unusual locations - was on the ground once while mowing the lawn and the lawn  was 'out in the woods' - unwitnessed; had bitten his tongue but no other associated features; thought he was having 'blackouts' but did not seek medical attention.   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 Patient previously resided in [place] and after he received his disability in 2009, moved to [facility] in 2010 (not taking AEDs after that time)     Patient reports that he continues to have seizures; previously occurred -12 per week but now occurs q 2-3 months intermittently (no definite frequency)  - currently he has an aura: feels lightheaded, followed by a light appearing on the left side of visual field that expands to the center followed by LOC. When he returns to awareness, reports post-event confusion, occasionally has tongue bite; no hx of b/b incontinence at any time. Reports generalized body soreness x ~3 days  Per witnesses: patient's eyes roll back, teeth grinding with  drooling and generalized stiffening      Triggers for seizures: social/personal stress; sleep deprivation;           The last generalized motor seizure was  9/17     The longest seizure-free interval: 6 months (~2 years ago)     There is no history of status epilepticus.    There is  history of physical injury as a result of seizures: falls     Any other relevant information:  - none     PREVIOUS EVALUATIONS:    Previous EEGs: reports EEG done possibly at onset - unsure     CT Head:      Results for orders placed or performed during the hospital encounter of 09/22/12   CT Head Without Contrast     Narrative     DATE OF EXAM: Sep 22 2012   T   0027  -  CT HEAD WITHOUT CONTRAST:     [id number]     CLINICAL HISTORY:   HEAD TRAUMA     ICD 9 CODE(S):   ()  CPT 4 CODE(S)/MODIFIER(S):   ()     Technique: Serial transaxial scans of the brain were obtained from the   skull base to the vertex. No priors are available for comparison.     Findings:      The ventricles and sulci are normal.  There is no evidence for acute or   subacute ischemia.  No intracranial hemorrhage or extra-axial blood or   fluid is present.  There is mild soft tissue swelling overlying the left   superior convexity.  There is a 16mm cystic pineal mass with dense   marginal dystrophic calcifications.  No surrounding edema is present.     The orbits, paranasal sinuses and mastoid air cells are clear.  No   fracture or destructive osseous lesion is seen.        Impression:     1.  No posttraumatic abnormality, specifically no evidence for fracture   or hemorrhage.  Mild soft tissue swelling overlying the left cerebral   convexity     2.  16mm cystic pineal mass in which further characterization with   contrast enhanced MR is recommended.  Considerations include pineocytoma   as well pineoblastoma.     Preliminary report given by virtual radiologic at 4:42 a.m. 09/22/12  ______________________________________      Electronically signed by: Scott Emanuel  MD  Date:     09/22/12  Time:    06:53            : JOANA  Transcribe Date/Time: Sep 22 2012  6:53A  Dictated by : ELVA LARA MD  Read On:      Images were reviewed, findings were verified and document was   electronically  SIGNED BY: ELVA LARA MD On: Sep 22 2012  6:53A         Additional tests:  1)CT Scan: no   2) EEG\Video Monitoring: no   3) PET Scan: no  4) Neuropsychological evaluation: no  5) DEXA Scan: no  6) Others: no     RISK FACTORS FOR SEIZURES:    1. Head Trauma:  Yes  - accidental trauma from a fall at age 7 years; no hx of LOC  2. CNS Infections:  No  3. CNS Tumors: No     4. CNS Vascular Disease: No     5. Febrile Seizures: No    6. Developmental Delay: No       7. Family History of Seizures: Yes; niece has seizures x early adulthood (on meds) - no details    8. Birth history: FTNVD     Pregnancy/Labor/Delivery: n/a     CURRENT MEDICATIONS:   Current Medications          Current Outpatient Prescriptions   Medication Sig Dispense Refill    losartan-hydrochlorothiazide 100-25 mg (HYZAAR) 100-25 mg per tablet Take 1 tablet by mouth once daily. 30 tablet 5    sertraline (ZOLOFT) 50 MG tablet Take 1 tablet (50 mg total) by mouth once daily. 30 tablet 11      No current facility-administered medications for this visit.          Folic acid: no     CURRENT ANTI EPILEPTIC MEDICATIONS:  none     VAGAL NERVE STIMULATOR: n/a     PRIOR ANTICONVULSANT HISTORY:   1) Carbamazepine (Tegretol, CBZ): used and beneficial - did not resolve completely; but stopped taking it  2) Phenytoin (Dilantin, PHT): used but not effective  3) Valproic acid (Depakote, VPA): used but not effective      * No surgery found *     Indwelling Lines/Drains at time of discharge:   Lines/Drains/Airways          No matching active lines, drains, or airways        Hospital Course:   41 yo male admitted to Epilepsy Monitoring Unit for further characterization of seizure episodes consisting of spacing out and episodes of  LOC with tonic limb extension. Has not been on any AEDs for 5+ years.    EE/5-: No events, EEG normal    Patient requesting discharge home . No events captured during admission, but given patients reports history AED is indicated at this time. Will start Zonisamide with plan to taper up to 300 mg qHS. Stable for discharge home today. Appointment scheduled to follow up with Dr. Sylvester in May 2018, with plan to check Zonisamide level around this time for further dose adjustment as needed.     Consults:     Significant Labs: All pertinent lab results from the past 24 hours have been reviewed.    Significant Studies: I have reviewed all pertinent imaging results/findings within the past 24 hours.    Pending Diagnostic Studies:     None        Final Active Diagnoses:    Diagnosis Date Noted POA    PRINCIPAL PROBLEM:  Complex partial epilepsy with generalization and with intractable epilepsy [G40.219] 2018 Yes    Mixed hyperlipidemia, risk factors smoker, HTN [E78.2] 2018 Yes    Pineal gland cyst [E34.8] 2018 Yes    Hypertension [I10]  Yes      Problems Resolved During this Admission:    Diagnosis Date Noted Date Resolved POA       No new Assessment & Plan notes have been filed under this hospital service since the last note was generated.  Service: Epilepsy      Discharged Condition: good    Disposition: Home or Self Care    Follow Up:  Follow-up Information     Kate Sylvester MD.    Specialty:  Neurology  Why:  Please follow up with Dr. Sylvester on 18 at 10am  Contact information:  [address]  [id number]  [place] [address]  [phone]                 Patient Instructions:     Diet Adult Regular     Activity as tolerated           Patient Instructions       Please take Zonisamide 100 mg (1 tablet) nightly for one week, then take 200 mg (2 tablets) nightly for one week, then take 300 mg (3 tablets) nightly and stay on this dose      Medications:  Reconciled Home Medications:       Medication List      START taking these medications    zonisamide 100 MG Cap  Commonly known as:  ZONEGRAN  Take 3 capsules (300 mg total) by mouth every evening.        CONTINUE taking these medications    losartan-hydrochlorothiazide 100-25 mg 100-25 mg per tablet  Commonly known as:  HYZAAR  Take 1 tablet by mouth once daily.     pravastatin 20 MG tablet  Commonly known as:  PRAVACHOL  Take 1 tablet (20 mg total) by mouth once daily.           Where to Get Your Medications      These medications were sent to [id number] - [place] - [address] AT [facility]  [address] [place] [address]    Hours:  24-hours Phone:  [phone]   · zonisamide 100 MG Cap       Time spent on the discharge of patient: 35 minutes    Corinne Holloway PA-C  Neurology-Epilepsy  [facility]  Staff: Dr. Nicole

## 2018-04-09 NOTE — PROCEDURES
DATE OF PROCEDURE:  04/06/2018, 04/07/2018, 04/08/2018 and 04/09/2018    EPILEPSY MONITORING UNIT  EEG/VIDEO TELEMETRY REPORT    METHODOLOGY:  Electroencephalographic (EEG) is recorded with electrodes placed   according to the International 10-20 placement system.  Thirty Two (32) channels   of digital signal, including T1 and T2 electrodes, are simultaneously recorded   from the scalp and may also include EKG, EMG and/or eye movement monitors.    Recording band pass was 0.1 to 512 Hz.  Digital video recording of the patient   is simultaneously recorded with the EEG.  The patient is instructed to report   clinical symptoms which may occur during the recording session.  EEG and video   recording are stored and archived in digital format. Activation procedures,   which include photic stimulation, hyperventilation and instructing patients to   perform simple tasks, are done in selected patients.    The EEG is displayed on a monitor screen and can be reformatted into different   montages for evaluation.  The entire recoding is submitted for computer-assisted   analysis to detect spike and electrographic seizure activity.  The entire   recording is visually reviewed, and the times identified by computer analysis as   being spikes or seizures are reviewed again.  Compressed spectral analysis   (CSA) is also performed on the activity recorded from each individual channel.    This is displayed as a power display of frequencies from 0 to 30 Hz over time.     The CSA analysis is done and displayed continuously.  This is reviewed for   asymmetries in power between homologous areas of the scalp and for presence of   changes in power which can be seen when seizures occur.  Sections of suspected   abnormalities on the CSA are then compared with the original EEG recording.    [employer] software was also utilized in the review of this study.  This software   suite analyzes the EEG recording in multiple domains.  Coherence and  rhythmicity   are computed to identify EEG sections which may contain organized seizures.    Each channel undergoes analysis to detect presence of spike and sharp waves   which have special and morphological characteristics of epileptic activity.  The   routine EEG recording is converted from special into frequency domain.  This is   then displayed comparing homologous areas to identify areas of significant   asymmetry.  Algorithm to identify non-cortically generated artifact is used to   separate artifact from the EEG.    RECORDING TIMES:  Start on 04/06/2018 at 1436  Stop on 04/09/2018 at 1248  Total duration is 54 hours and 33 minutes.    EEG FINDINGS:  At baseline, this study consists of a well-formed background with   a 9 to 10 Hz posterior dominant rhythm, best seen in the occipital channels   with a mix of alpha and beta frequency seen anteriorly admixed with movement and   EMG artifacts.  Sleep is captured both stage II and stage N3 briefly with   normal stage II architecture such as sleep spindles and K complexes and brief   delta sleep seen at times.    The record is symmetrical without signs of any interictal epileptiform   disturbances.  Photic stimulation and hyperventilation were performed multiple   times, which caused the patient to feel uncomfortable and elicited some leg   movements, but no seizures were seen.  None of the patient's typical events were   provoked.  He had no spontaneous events either.  There were no electrographic   seizures.  There were no focal findings.    INTERPRETATION:  Normal awake and asleep EEG during extended EMU monitoring   stay.  None of the patient's typical events were captured and no electrographic   seizures or interictal epileptiform disturbances were seen.      LUIZ/RUBINA  dd: 04/09/2018 13:49:25 (CDT)  td: 04/09/2018 14:06:29 (CDT)  Doc ID   #[id number]  Job ID #[id number]    CC:

## 2018-04-09 NOTE — ASSESSMENT & PLAN NOTE
43 yo male with history of seizures since 2005 who presents as direct admit to EMU for further characterization of events. Not on any home AEDs for over 5 years after running out of refills.     - Discontinue VEEG  - Not on any home AEDs  - Patient requesting discharge home 4/9. No events captured during admission, but given patients reports history AED is indicated at this time.   - Start Zonisamide with plan to taper up to 300 mg qHS.   - Stable for discharge home today.   - Appointment scheduled to follow up with Dr. Sylvester in May 2018, with plan to check Zonisamide level around this time for further dose adjustment as needed.

## 2018-04-09 NOTE — PLAN OF CARE
Problem: Patient Care Overview  Goal: Plan of Care Review  Outcome: Ongoing (interventions implemented as appropriate)  Plan of care discussed with pt and mother. Questions and concerns addressed. VSS and neuro WNL. Pt sleep deprived til 0400 this shift; no significant events noted this shift. No distress or pain noted this shift. Call light in reach. Fall and seizure precautions in place. Will cont to monitor.

## 2018-04-09 NOTE — PLAN OF CARE
PCP:Kermit Mccall MD    Extended Emergency Contact Information  Primary Emergency Contact: More Wade   [facility]  Home Phone: [phone]  Mobile Phone: [phone]  Relation: Mother    [id number] - [place] - [address] AT [facility] [address]  [address]  [place] [address]  Phone: [phone] Fax: [phone]    Payor: / [employer] medicare    Patient was independent living with his mother, step father and 5 yr old grandson prior to hospitalization. Patient has no discharge needs at this time. Cm will continue to follow.       04/09/18 1100   Discharge Assessment   Assessment Type Discharge Planning Assessment   Confirmed/corrected address and phone number on facesheet? Yes   Assessment information obtained from? Patient;Caregiver   Expected Length of Stay (days) 1   Communicated expected length of stay with patient/caregiver yes   Prior to hospitilization cognitive status: Alert/Oriented   Prior to hospitalization functional status: Independent   Current cognitive status: Alert/Oriented   Current Functional Status: Independent   Facility Arrived From: Home   Lives With grandchild(karin);parent(s)   Able to Return to Prior Arrangements yes   Is patient able to care for self after discharge? Yes   Who are your caregiver(s) and their phone number(s)? More Wade (mother) H [phone] C [phone]   Patient's perception of discharge disposition home or selfcare   Readmission Within The Last 30 Days no previous admission in last 30 days   Patient currently being followed by outpatient case management? No   Patient currently receives any other outside agency services? No   Equipment Currently Used at Home none   Do you have any problems affording any of your prescribed medications? No   Is the patient taking medications as prescribed? yes   Does the patient have transportation home? Yes   Transportation Available car;family or friend will provide   Does the  patient receive services at the [facility]? No   Discharge Plan A Home with family   Discharge Plan B Home with family   Patient/Family In Agreement With Plan yes

## 2018-04-09 NOTE — HOSPITAL COURSE
43 yo male admitted to Epilepsy Monitoring Unit for further characterization of seizure episodes consisting of spacing out and episodes of LOC with tonic limb extension. Has not been on any AEDs for 5+ years.    EE/5-: No events, EEG normal    Patient requesting discharge home . No events captured during admission, but given patients reports history AED is indicated at this time. Will start Zonisamide with plan to taper up to 300 mg qHS. Stable for discharge home today. Appointment scheduled to follow up with Dr. Sylvester in May 2018, with plan to check Zonisamide level around this time for further dose adjustment as needed.

## 2018-05-17 ENCOUNTER — TELEPHONE (OUTPATIENT)
Dept: NEUROLOGY | Facility: CLINIC | Age: 43
End: 2018-05-17

## 2018-05-17 ENCOUNTER — CLINICAL SUPPORT (OUTPATIENT)
Dept: URGENT CARE | Facility: CLINIC | Age: 43
End: 2018-05-17

## 2018-05-17 DIAGNOSIS — Z02.83 ENCOUNTER FOR DRUG SCREENING: ICD-10-CM

## 2018-05-17 DIAGNOSIS — Z00.00 PHYSICAL EXAM: Primary | ICD-10-CM

## 2018-05-17 PROCEDURE — 99499 UNLISTED E&M SERVICE: CPT | Mod: S$GLB,,, | Performed by: PREVENTIVE MEDICINE

## 2018-05-17 PROCEDURE — 80305 DRUG TEST PRSMV DIR OPT OBS: CPT | Mod: S$GLB,,, | Performed by: PREVENTIVE MEDICINE

## 2018-05-17 NOTE — TELEPHONE ENCOUNTER
----- Message from Arlene Levy sent at 5/17/2018  2:24 PM CDT -----  Contact: Saran (mother) @ [phone]  Calling to speak with someone in the doctors office to get papers completed by the doctor. Please call

## 2018-09-22 ENCOUNTER — HOSPITAL ENCOUNTER (EMERGENCY)
Facility: HOSPITAL | Age: 43
Discharge: HOME | End: 2018-09-22
Attending: EMERGENCY MEDICINE
Payer: COMMERCIAL

## 2018-09-22 VITALS
SYSTOLIC BLOOD PRESSURE: 163 MMHG | HEART RATE: 88 BPM | WEIGHT: 165 LBS | RESPIRATION RATE: 18 BRPM | BODY MASS INDEX: 23.62 KG/M2 | OXYGEN SATURATION: 98 % | HEIGHT: 70 IN | TEMPERATURE: 99 F | DIASTOLIC BLOOD PRESSURE: 93 MMHG

## 2018-09-22 DIAGNOSIS — Z04.1 ENCOUNTER FOR EXAMINATION FOLLOWING MOTOR VEHICLE ACCIDENT: ICD-10-CM
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
## 2018-09-22 DIAGNOSIS — S80.12XA HEMATOMA OF LEG, LEFT, INITIAL ENCOUNTER: Primary | ICD-10-CM

## 2018-09-22 LAB
ALBUMIN SERPL BCP-MCNC: 4 G/DL
ALP SERPL-CCNC: 82 U/L
ALT SERPL W/O P-5'-P-CCNC: 16 U/L
ANION GAP SERPL CALC-SCNC: 11 MMOL/L
AST SERPL-CCNC: 17 U/L
BASOPHILS NFR BLD: 0 %
BILIRUB SERPL-MCNC: 0.3 MG/DL
BILIRUB UR QL STRIP: NEGATIVE
BUN SERPL-MCNC: 17 MG/DL
CALCIUM SERPL-MCNC: 9.7 MG/DL
CHLORIDE SERPL-SCNC: 103 MMOL/L
CK SERPL-CCNC: 145 U/L
CLARITY UR: CLEAR
CO2 SERPL-SCNC: 26 MMOL/L
COLOR UR: YELLOW
CREAT SERPL-MCNC: 1.1 MG/DL
DIFFERENTIAL METHOD: ABNORMAL
EOSINOPHIL NFR BLD: 0 %
ERYTHROCYTE [DISTWIDTH] IN BLOOD BY AUTOMATED COUNT: 14.4 %
EST. GFR  (AFRICAN AMERICAN): >60 ML/MIN/1.73 M^2
EST. GFR  (NON AFRICAN AMERICAN): >60 ML/MIN/1.73 M^2
GLUCOSE SERPL-MCNC: 83 MG/DL
GLUCOSE UR QL STRIP: NEGATIVE
HCT VFR BLD AUTO: 45.9 %
HGB BLD-MCNC: 15.5 G/DL
HGB UR QL STRIP: NEGATIVE
KETONES UR QL STRIP: NEGATIVE
LACTATE SERPL-SCNC: 1.1 MMOL/L
LEUKOCYTE ESTERASE UR QL STRIP: NEGATIVE
LYMPHOCYTES NFR BLD: 19 %
MCH RBC QN AUTO: 30.6 PG
MCHC RBC AUTO-ENTMCNC: 33.8 G/DL
MCV RBC AUTO: 91 FL
MONOCYTES NFR BLD: 15 %
NEUTROPHILS NFR BLD: 66 %
NITRITE UR QL STRIP: NEGATIVE
PH UR STRIP: 5 [PH] (ref 5–8)
PLATELET # BLD AUTO: 408 K/UL
PMV BLD AUTO: 9.9 FL
POTASSIUM SERPL-SCNC: 4.5 MMOL/L
PROT SERPL-MCNC: 7.8 G/DL
PROT UR QL STRIP: NEGATIVE
RBC # BLD AUTO: 5.07 M/UL
SODIUM SERPL-SCNC: 140 MMOL/L
SP GR UR STRIP: >=1.03 (ref 1–1.03)
URN SPEC COLLECT METH UR: ABNORMAL
UROBILINOGEN UR STRIP-ACNC: NEGATIVE EU/DL
WBC # BLD AUTO: 14.33 K/UL

## 2018-09-22 PROCEDURE — 96374 THER/PROPH/DIAG INJ IV PUSH: CPT

## 2018-09-22 PROCEDURE — 96375 TX/PRO/DX INJ NEW DRUG ADDON: CPT

## 2018-09-22 PROCEDURE — 99285 EMERGENCY DEPT VISIT HI MDM: CPT | Mod: 25

## 2018-09-22 PROCEDURE — 25000003 PHARM REV CODE 250: Performed by: EMERGENCY MEDICINE

## 2018-09-22 PROCEDURE — 96361 HYDRATE IV INFUSION ADD-ON: CPT

## 2018-09-22 PROCEDURE — 63600175 PHARM REV CODE 636 W HCPCS: Performed by: EMERGENCY MEDICINE

## 2018-09-22 PROCEDURE — 85027 COMPLETE CBC AUTOMATED: CPT

## 2018-09-22 PROCEDURE — 81003 URINALYSIS AUTO W/O SCOPE: CPT

## 2018-09-22 PROCEDURE — 83605 ASSAY OF LACTIC ACID: CPT

## 2018-09-22 PROCEDURE — 82550 ASSAY OF CK (CPK): CPT

## 2018-09-22 PROCEDURE — 80053 COMPREHEN METABOLIC PANEL: CPT

## 2018-09-22 PROCEDURE — 85007 BL SMEAR W/DIFF WBC COUNT: CPT

## 2018-09-22 RX ORDER — KETOROLAC TROMETHAMINE 30 MG/ML
30 INJECTION, SOLUTION INTRAMUSCULAR; INTRAVENOUS
Status: COMPLETED | OUTPATIENT
Start: 2018-09-22 | End: 2018-09-22

## 2018-09-22 RX ORDER — HYDROCODONE BITARTRATE AND ACETAMINOPHEN 5; 325 MG/1; MG/1
1 TABLET ORAL EVERY 8 HOURS PRN
Qty: 8 TABLET | Refills: 0 | Status: SHIPPED | OUTPATIENT
Start: 2018-09-22 | End: 2018-09-25

## 2018-09-22 RX ORDER — SODIUM CHLORIDE 9 MG/ML
1000 INJECTION, SOLUTION INTRAVENOUS
Status: COMPLETED | OUTPATIENT
Start: 2018-09-22 | End: 2018-09-22

## 2018-09-22 RX ORDER — MORPHINE SULFATE 10 MG/ML
5 INJECTION INTRAMUSCULAR; INTRAVENOUS; SUBCUTANEOUS
Status: COMPLETED | OUTPATIENT
Start: 2018-09-22 | End: 2018-09-22

## 2018-09-22 RX ADMIN — MORPHINE SULFATE 5 MG: 10 INJECTION INTRAVENOUS at 05:09

## 2018-09-22 RX ADMIN — SODIUM CHLORIDE 1000 ML: 0.9 INJECTION, SOLUTION INTRAVENOUS at 07:09

## 2018-09-22 RX ADMIN — KETOROLAC TROMETHAMINE 30 MG: 30 INJECTION, SOLUTION INTRAMUSCULAR at 07:09
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
## 2018-10-04 ENCOUNTER — PATIENT MESSAGE (OUTPATIENT)
Dept: FAMILY MEDICINE | Facility: CLINIC | Age: 43
End: 2018-10-04

## 2020-01-06 DIAGNOSIS — I10 HYPERTENSION: ICD-10-CM

## 2020-06-13 ENCOUNTER — HOSPITAL ENCOUNTER (INPATIENT)
Facility: HOSPITAL | Age: 45
LOS: 1 days | Discharge: LEFT BEFORE DISCHARGE | DRG: 445 | End: 2020-06-14
Attending: EMERGENCY MEDICINE | Admitting: EMERGENCY MEDICINE
Payer: MEDICAID

## 2020-06-13 DIAGNOSIS — A41.9 SEPSIS, DUE TO UNSPECIFIED ORGANISM, UNSPECIFIED WHETHER ACUTE ORGAN DYSFUNCTION PRESENT: Primary | ICD-10-CM

## 2020-06-13 DIAGNOSIS — R10.11 RIGHT UPPER QUADRANT ABDOMINAL PAIN: ICD-10-CM

## 2020-06-13 DIAGNOSIS — R45.1 AGITATION: ICD-10-CM

## 2020-06-13 DIAGNOSIS — R50.9 FEVER OF UNKNOWN ORIGIN: ICD-10-CM

## 2020-06-13 LAB
ALBUMIN SERPL BCP-MCNC: 3.4 G/DL (ref 3.5–5.2)
ALLENS TEST: ABNORMAL
ALP SERPL-CCNC: 193 U/L (ref 55–135)
ALT SERPL W/O P-5'-P-CCNC: 164 U/L (ref 10–44)
AMPHET+METHAMPHET UR QL: NORMAL
ANION GAP SERPL CALC-SCNC: 11 MMOL/L (ref 8–16)
AST SERPL-CCNC: 163 U/L (ref 10–40)
B-OH-BUTYR BLD STRIP-SCNC: 0.2 MMOL/L (ref 0–0.5)
BACTERIA #/AREA URNS HPF: ABNORMAL /HPF
BARBITURATES UR QL SCN>200 NG/ML: NEGATIVE
BASOPHILS # BLD AUTO: 0.01 K/UL (ref 0–0.2)
BASOPHILS NFR BLD: 0.3 % (ref 0–1.9)
BENZODIAZ UR QL SCN>200 NG/ML: NEGATIVE
BILIRUB SERPL-MCNC: 1.8 MG/DL (ref 0.1–1)
BILIRUB UR QL STRIP: NEGATIVE
BNP SERPL-MCNC: 19 PG/ML (ref 0–99)
BUN SERPL-MCNC: 15 MG/DL (ref 6–20)
BZE UR QL SCN: NEGATIVE
CALCIUM SERPL-MCNC: 8.4 MG/DL (ref 8.7–10.5)
CANNABINOIDS UR QL SCN: NEGATIVE
CHLORIDE SERPL-SCNC: 105 MMOL/L (ref 95–110)
CK SERPL-CCNC: 98 U/L (ref 20–200)
CLARITY UR: CLEAR
CO2 SERPL-SCNC: 23 MMOL/L (ref 23–29)
COLOR UR: YELLOW
CREAT SERPL-MCNC: 0.9 MG/DL (ref 0.5–1.4)
CREAT UR-MCNC: 23.3 MG/DL (ref 23–375)
CRP SERPL-MCNC: 13.4 MG/L (ref 0–8.2)
DELSYS: ABNORMAL
DIFFERENTIAL METHOD: ABNORMAL
EOSINOPHIL # BLD AUTO: 0 K/UL (ref 0–0.5)
EOSINOPHIL NFR BLD: 0 % (ref 0–8)
ERYTHROCYTE [DISTWIDTH] IN BLOOD BY AUTOMATED COUNT: 14.2 % (ref 11.5–14.5)
EST. GFR  (AFRICAN AMERICAN): >60 ML/MIN/1.73 M^2
EST. GFR  (NON AFRICAN AMERICAN): >60 ML/MIN/1.73 M^2
FIO2: 21
GLUCOSE SERPL-MCNC: 85 MG/DL (ref 70–110)
GLUCOSE UR QL STRIP: NEGATIVE
HCO3 UR-SCNC: 24 MMOL/L (ref 24–28)
HCT VFR BLD AUTO: 41.4 % (ref 40–54)
HGB BLD-MCNC: 14.5 G/DL (ref 14–18)
HGB UR QL STRIP: ABNORMAL
IMM GRANULOCYTES # BLD AUTO: 0.02 K/UL (ref 0–0.04)
IMM GRANULOCYTES NFR BLD AUTO: 0.7 % (ref 0–0.5)
KETONES UR QL STRIP: NEGATIVE
LEUKOCYTE ESTERASE UR QL STRIP: NEGATIVE
LYMPHOCYTES # BLD AUTO: 0.1 K/UL (ref 1–4.8)
LYMPHOCYTES NFR BLD: 4.9 % (ref 18–48)
MAGNESIUM SERPL-MCNC: 1.3 MG/DL (ref 1.6–2.6)
MCH RBC QN AUTO: 30.7 PG (ref 27–31)
MCHC RBC AUTO-ENTMCNC: 35 G/DL (ref 32–36)
MCV RBC AUTO: 88 FL (ref 82–98)
METHADONE UR QL SCN>300 NG/ML: NEGATIVE
MICROSCOPIC COMMENT: ABNORMAL
MODE: ABNORMAL
MONOCYTES # BLD AUTO: 0 K/UL (ref 0.3–1)
MONOCYTES NFR BLD: 0 % (ref 4–15)
NEUTROPHILS # BLD AUTO: 2.7 K/UL (ref 1.8–7.7)
NEUTROPHILS NFR BLD: 94.1 % (ref 38–73)
NITRITE UR QL STRIP: NEGATIVE
NRBC BLD-RTO: 0 /100 WBC
OPIATES UR QL SCN: NORMAL
PCO2 BLDA: 34.3 MMHG (ref 35–45)
PCP UR QL SCN>25 NG/ML: NEGATIVE
PH SMN: 7.45 [PH] (ref 7.35–7.45)
PH UR STRIP: 5 [PH] (ref 5–8)
PLATELET # BLD AUTO: 147 K/UL (ref 150–350)
PMV BLD AUTO: 11 FL (ref 9.2–12.9)
PO2 BLDA: 28 MMHG (ref 40–60)
POC BE: 1 MMOL/L
POC SATURATED O2: 56 % (ref 95–100)
POC TCO2: 25 MMOL/L (ref 24–29)
POCT GLUCOSE: 89 MG/DL (ref 70–110)
POTASSIUM SERPL-SCNC: 4.3 MMOL/L (ref 3.5–5.1)
PROCALCITONIN SERPL IA-MCNC: 1.56 NG/ML
PROT SERPL-MCNC: 6.7 G/DL (ref 6–8.4)
PROT UR QL STRIP: NEGATIVE
RBC # BLD AUTO: 4.72 M/UL (ref 4.6–6.2)
RBC #/AREA URNS HPF: 10 /HPF (ref 0–4)
SAMPLE: ABNORMAL
SARS-COV-2 RDRP RESP QL NAA+PROBE: NEGATIVE
SITE: ABNORMAL
SODIUM SERPL-SCNC: 139 MMOL/L (ref 136–145)
SP GR UR STRIP: 1 (ref 1–1.03)
T4 FREE SERPL-MCNC: 0.99 NG/DL (ref 0.71–1.51)
TOXICOLOGY INFORMATION: NORMAL
TROPONIN I SERPL DL<=0.01 NG/ML-MCNC: <0.006 NG/ML (ref 0–0.03)
TSH SERPL DL<=0.005 MIU/L-ACNC: 0.19 UIU/ML (ref 0.4–4)
URN SPEC COLLECT METH UR: ABNORMAL
UROBILINOGEN UR STRIP-ACNC: ABNORMAL EU/DL
WBC # BLD AUTO: 2.88 K/UL (ref 3.9–12.7)
WBC #/AREA URNS HPF: 1 /HPF (ref 0–5)

## 2020-06-13 PROCEDURE — 63600175 PHARM REV CODE 636 W HCPCS: Performed by: EMERGENCY MEDICINE

## 2020-06-13 PROCEDURE — 80329 ANALGESICS NON-OPIOID 1 OR 2: CPT

## 2020-06-13 PROCEDURE — 81000 URINALYSIS NONAUTO W/SCOPE: CPT | Mod: 59

## 2020-06-13 PROCEDURE — 87040 BLOOD CULTURE FOR BACTERIA: CPT

## 2020-06-13 PROCEDURE — 82550 ASSAY OF CK (CPK): CPT

## 2020-06-13 PROCEDURE — 83735 ASSAY OF MAGNESIUM: CPT | Mod: 91

## 2020-06-13 PROCEDURE — 84484 ASSAY OF TROPONIN QUANT: CPT

## 2020-06-13 PROCEDURE — 25000003 PHARM REV CODE 250: Performed by: EMERGENCY MEDICINE

## 2020-06-13 PROCEDURE — 96368 THER/DIAG CONCURRENT INF: CPT

## 2020-06-13 PROCEDURE — 96361 HYDRATE IV INFUSION ADD-ON: CPT

## 2020-06-13 PROCEDURE — 82803 BLOOD GASES ANY COMBINATION: CPT

## 2020-06-13 PROCEDURE — 86140 C-REACTIVE PROTEIN: CPT

## 2020-06-13 PROCEDURE — 83880 ASSAY OF NATRIURETIC PEPTIDE: CPT

## 2020-06-13 PROCEDURE — 80053 COMPREHEN METABOLIC PANEL: CPT

## 2020-06-13 PROCEDURE — 96375 TX/PRO/DX INJ NEW DRUG ADDON: CPT

## 2020-06-13 PROCEDURE — 84439 ASSAY OF FREE THYROXINE: CPT

## 2020-06-13 PROCEDURE — 80307 DRUG TEST PRSMV CHEM ANLYZR: CPT

## 2020-06-13 PROCEDURE — 99291 CRITICAL CARE FIRST HOUR: CPT | Mod: 25

## 2020-06-13 PROCEDURE — 84145 PROCALCITONIN (PCT): CPT

## 2020-06-13 PROCEDURE — 99900035 HC TECH TIME PER 15 MIN (STAT)

## 2020-06-13 PROCEDURE — 83605 ASSAY OF LACTIC ACID: CPT

## 2020-06-13 PROCEDURE — 96376 TX/PRO/DX INJ SAME DRUG ADON: CPT

## 2020-06-13 PROCEDURE — 12000002 HC ACUTE/MED SURGE SEMI-PRIVATE ROOM

## 2020-06-13 PROCEDURE — 82962 GLUCOSE BLOOD TEST: CPT

## 2020-06-13 PROCEDURE — 96365 THER/PROPH/DIAG IV INF INIT: CPT

## 2020-06-13 PROCEDURE — 84443 ASSAY THYROID STIM HORMONE: CPT

## 2020-06-13 PROCEDURE — 82140 ASSAY OF AMMONIA: CPT

## 2020-06-13 PROCEDURE — 85025 COMPLETE CBC W/AUTO DIFF WBC: CPT

## 2020-06-13 PROCEDURE — 87502 INFLUENZA DNA AMP PROBE: CPT

## 2020-06-13 PROCEDURE — U0002 COVID-19 LAB TEST NON-CDC: HCPCS

## 2020-06-13 PROCEDURE — 82010 KETONE BODYS QUAN: CPT

## 2020-06-13 RX ORDER — DIAZEPAM 10 MG/2ML
10 INJECTION INTRAMUSCULAR
Status: COMPLETED | OUTPATIENT
Start: 2020-06-13 | End: 2020-06-13

## 2020-06-13 RX ORDER — MIDAZOLAM HYDROCHLORIDE 1 MG/ML
2 INJECTION INTRAMUSCULAR; INTRAVENOUS
Status: COMPLETED | OUTPATIENT
Start: 2020-06-14 | End: 2020-06-14

## 2020-06-13 RX ORDER — MORPHINE SULFATE 10 MG/ML
6 INJECTION INTRAMUSCULAR; INTRAVENOUS; SUBCUTANEOUS
Status: COMPLETED | OUTPATIENT
Start: 2020-06-13 | End: 2020-06-13

## 2020-06-13 RX ORDER — DIAZEPAM 10 MG/2ML
20 INJECTION INTRAMUSCULAR
Status: COMPLETED | OUTPATIENT
Start: 2020-06-13 | End: 2020-06-13

## 2020-06-13 RX ADMIN — DIAZEPAM 10 MG: 5 INJECTION, SOLUTION INTRAMUSCULAR; INTRAVENOUS at 10:06

## 2020-06-13 RX ADMIN — DIAZEPAM 20 MG: 5 INJECTION, SOLUTION INTRAMUSCULAR; INTRAVENOUS at 09:06

## 2020-06-13 RX ADMIN — SODIUM CHLORIDE 2244 ML: 0.9 INJECTION, SOLUTION INTRAVENOUS at 10:06

## 2020-06-13 RX ADMIN — VANCOMYCIN HYDROCHLORIDE 1500 MG: 1.5 INJECTION, POWDER, LYOPHILIZED, FOR SOLUTION INTRAVENOUS at 11:06

## 2020-06-13 RX ADMIN — DIAZEPAM 10 MG: 5 INJECTION, SOLUTION INTRAMUSCULAR; INTRAVENOUS at 09:06

## 2020-06-13 RX ADMIN — MORPHINE SULFATE 6 MG: 10 INJECTION INTRAVENOUS at 09:06

## 2020-06-13 RX ADMIN — PIPERACILLIN AND TAZOBACTAM 4.5 G: 4; .5 INJECTION, POWDER, LYOPHILIZED, FOR SOLUTION INTRAVENOUS; PARENTERAL at 10:06

## 2020-06-14 VITALS
WEIGHT: 144.38 LBS | TEMPERATURE: 98 F | HEIGHT: 70 IN | HEART RATE: 106 BPM | SYSTOLIC BLOOD PRESSURE: 166 MMHG | BODY MASS INDEX: 20.67 KG/M2 | OXYGEN SATURATION: 100 % | DIASTOLIC BLOOD PRESSURE: 105 MMHG | RESPIRATION RATE: 16 BRPM

## 2020-06-14 PROBLEM — A41.9 SEPSIS: Status: ACTIVE | Noted: 2020-06-14

## 2020-06-14 PROBLEM — G40.909 EPILEPSY: Status: ACTIVE | Noted: 2020-06-14

## 2020-06-14 PROBLEM — R10.9 ABDOMINAL PAIN: Status: ACTIVE | Noted: 2020-06-14

## 2020-06-14 LAB
ALBUMIN SERPL BCP-MCNC: 3.1 G/DL (ref 3.5–5.2)
ALP SERPL-CCNC: 103 U/L (ref 55–135)
ALT SERPL W/O P-5'-P-CCNC: 147 U/L (ref 10–44)
AMMONIA PLAS-SCNC: 35 UMOL/L (ref 10–50)
ANION GAP SERPL CALC-SCNC: 12 MMOL/L (ref 8–16)
APAP SERPL-MCNC: <3 UG/ML (ref 10–20)
AST SERPL-CCNC: 145 U/L (ref 10–40)
BASOPHILS NFR BLD: 0 % (ref 0–1.9)
BILIRUB SERPL-MCNC: 5 MG/DL (ref 0.1–1)
BUN SERPL-MCNC: 14 MG/DL (ref 6–20)
CALCIUM SERPL-MCNC: 8.3 MG/DL (ref 8.7–10.5)
CHLORIDE SERPL-SCNC: 107 MMOL/L (ref 95–110)
CO2 SERPL-SCNC: 21 MMOL/L (ref 23–29)
CREAT SERPL-MCNC: 0.9 MG/DL (ref 0.5–1.4)
DIFFERENTIAL METHOD: ABNORMAL
EOSINOPHIL NFR BLD: 0 % (ref 0–8)
ERYTHROCYTE [DISTWIDTH] IN BLOOD BY AUTOMATED COUNT: 14.5 % (ref 11.5–14.5)
EST. GFR  (AFRICAN AMERICAN): >60 ML/MIN/1.73 M^2
EST. GFR  (NON AFRICAN AMERICAN): >60 ML/MIN/1.73 M^2
GLUCOSE SERPL-MCNC: 102 MG/DL (ref 70–110)
HCT VFR BLD AUTO: 43.1 % (ref 40–54)
HGB BLD-MCNC: 15.3 G/DL (ref 14–18)
HIV1+2 IGG SERPL QL IA.RAPID: NORMAL
IMM GRANULOCYTES # BLD AUTO: ABNORMAL K/UL (ref 0–0.04)
IMM GRANULOCYTES NFR BLD AUTO: ABNORMAL % (ref 0–0.5)
LACTATE SERPL-SCNC: 3.7 MMOL/L (ref 0.5–2.2)
LACTATE SERPL-SCNC: 3.7 MMOL/L (ref 0.5–2.2)
LACTATE SERPL-SCNC: 4 MMOL/L (ref 0.5–2.2)
LIPASE SERPL-CCNC: 49 U/L (ref 4–60)
LYMPHOCYTES NFR BLD: 1 % (ref 18–48)
MAGNESIUM SERPL-MCNC: 1.4 MG/DL (ref 1.6–2.6)
MAGNESIUM SERPL-MCNC: 1.8 MG/DL (ref 1.6–2.6)
MCH RBC QN AUTO: 31.5 PG (ref 27–31)
MCHC RBC AUTO-ENTMCNC: 35.5 G/DL (ref 32–36)
MCV RBC AUTO: 89 FL (ref 82–98)
MONOCYTES NFR BLD: 7 % (ref 4–15)
NEUTROPHILS NFR BLD: 31 % (ref 38–73)
NEUTS BAND NFR BLD MANUAL: 61 %
NRBC BLD-RTO: 0 /100 WBC
PLATELET # BLD AUTO: 94 K/UL (ref 150–350)
PLATELET BLD QL SMEAR: ABNORMAL
PMV BLD AUTO: 10.5 FL (ref 9.2–12.9)
POTASSIUM SERPL-SCNC: 3.6 MMOL/L (ref 3.5–5.1)
PROT SERPL-MCNC: 6 G/DL (ref 6–8.4)
RBC # BLD AUTO: 4.85 M/UL (ref 4.6–6.2)
SODIUM SERPL-SCNC: 140 MMOL/L (ref 136–145)
WBC # BLD AUTO: 30.21 K/UL (ref 3.9–12.7)

## 2020-06-14 PROCEDURE — 80053 COMPREHEN METABOLIC PANEL: CPT

## 2020-06-14 PROCEDURE — 25500020 PHARM REV CODE 255: Performed by: EMERGENCY MEDICINE

## 2020-06-14 PROCEDURE — 85027 COMPLETE CBC AUTOMATED: CPT

## 2020-06-14 PROCEDURE — 96367 TX/PROPH/DG ADDL SEQ IV INF: CPT

## 2020-06-14 PROCEDURE — 21400001 HC TELEMETRY ROOM

## 2020-06-14 PROCEDURE — 83605 ASSAY OF LACTIC ACID: CPT | Mod: 91

## 2020-06-14 PROCEDURE — 83690 ASSAY OF LIPASE: CPT

## 2020-06-14 PROCEDURE — 25000003 PHARM REV CODE 250: Performed by: EMERGENCY MEDICINE

## 2020-06-14 PROCEDURE — 36415 COLL VENOUS BLD VENIPUNCTURE: CPT

## 2020-06-14 PROCEDURE — 86703 HIV-1/HIV-2 1 RESULT ANTBDY: CPT

## 2020-06-14 PROCEDURE — 94761 N-INVAS EAR/PLS OXIMETRY MLT: CPT

## 2020-06-14 PROCEDURE — 96375 TX/PRO/DX INJ NEW DRUG ADDON: CPT

## 2020-06-14 PROCEDURE — 63600175 PHARM REV CODE 636 W HCPCS: Performed by: EMERGENCY MEDICINE

## 2020-06-14 PROCEDURE — 85007 BL SMEAR W/DIFF WBC COUNT: CPT

## 2020-06-14 PROCEDURE — 83735 ASSAY OF MAGNESIUM: CPT

## 2020-06-14 PROCEDURE — 25000003 PHARM REV CODE 250: Performed by: FAMILY MEDICINE

## 2020-06-14 PROCEDURE — 25000003 PHARM REV CODE 250: Performed by: INTERNAL MEDICINE

## 2020-06-14 PROCEDURE — 63600175 PHARM REV CODE 636 W HCPCS: Performed by: INTERNAL MEDICINE

## 2020-06-14 PROCEDURE — 83605 ASSAY OF LACTIC ACID: CPT

## 2020-06-14 PROCEDURE — 63600175 PHARM REV CODE 636 W HCPCS: Performed by: FAMILY MEDICINE

## 2020-06-14 RX ORDER — IBUPROFEN 200 MG
16 TABLET ORAL
Status: DISCONTINUED | OUTPATIENT
Start: 2020-06-14 | End: 2020-06-14 | Stop reason: HOSPADM

## 2020-06-14 RX ORDER — SODIUM CHLORIDE 9 MG/ML
INJECTION, SOLUTION INTRAVENOUS CONTINUOUS
Status: DISCONTINUED | OUTPATIENT
Start: 2020-06-14 | End: 2020-06-14 | Stop reason: SDUPTHER

## 2020-06-14 RX ORDER — MORPHINE SULFATE 10 MG/ML
4 INJECTION INTRAMUSCULAR; INTRAVENOUS; SUBCUTANEOUS EVERY 4 HOURS PRN
Status: DISCONTINUED | OUTPATIENT
Start: 2020-06-14 | End: 2020-06-14 | Stop reason: SDUPTHER

## 2020-06-14 RX ORDER — LORAZEPAM 2 MG/ML
2 INJECTION INTRAMUSCULAR
Status: DISCONTINUED | OUTPATIENT
Start: 2020-06-14 | End: 2020-06-14 | Stop reason: HOSPADM

## 2020-06-14 RX ORDER — AMOXICILLIN 250 MG
1 CAPSULE ORAL 2 TIMES DAILY
Status: DISCONTINUED | OUTPATIENT
Start: 2020-06-14 | End: 2020-06-14 | Stop reason: HOSPADM

## 2020-06-14 RX ORDER — ACETAMINOPHEN 325 MG/1
650 TABLET ORAL EVERY 8 HOURS PRN
Status: DISCONTINUED | OUTPATIENT
Start: 2020-06-14 | End: 2020-06-14 | Stop reason: HOSPADM

## 2020-06-14 RX ORDER — FAMOTIDINE 20 MG/1
20 TABLET, FILM COATED ORAL 2 TIMES DAILY
Status: DISCONTINUED | OUTPATIENT
Start: 2020-06-14 | End: 2020-06-14 | Stop reason: HOSPADM

## 2020-06-14 RX ORDER — DIAZEPAM 5 MG/1
10 TABLET ORAL
Status: DISCONTINUED | OUTPATIENT
Start: 2020-06-14 | End: 2020-06-14

## 2020-06-14 RX ORDER — MAGNESIUM SULFATE HEPTAHYDRATE 40 MG/ML
2 INJECTION, SOLUTION INTRAVENOUS ONCE
Status: COMPLETED | OUTPATIENT
Start: 2020-06-14 | End: 2020-06-14

## 2020-06-14 RX ORDER — MORPHINE SULFATE 10 MG/ML
4 INJECTION INTRAMUSCULAR; INTRAVENOUS; SUBCUTANEOUS EVERY 4 HOURS PRN
Status: DISCONTINUED | OUTPATIENT
Start: 2020-06-14 | End: 2020-06-14 | Stop reason: HOSPADM

## 2020-06-14 RX ORDER — SODIUM CHLORIDE 9 MG/ML
INJECTION, SOLUTION INTRAVENOUS CONTINUOUS
Status: DISCONTINUED | OUTPATIENT
Start: 2020-06-14 | End: 2020-06-14 | Stop reason: HOSPADM

## 2020-06-14 RX ORDER — SODIUM CHLORIDE 0.9 % (FLUSH) 0.9 %
10 SYRINGE (ML) INJECTION
Status: DISCONTINUED | OUTPATIENT
Start: 2020-06-14 | End: 2020-06-14 | Stop reason: HOSPADM

## 2020-06-14 RX ORDER — ONDANSETRON 2 MG/ML
4 INJECTION INTRAMUSCULAR; INTRAVENOUS EVERY 8 HOURS PRN
Status: DISCONTINUED | OUTPATIENT
Start: 2020-06-14 | End: 2020-06-14 | Stop reason: HOSPADM

## 2020-06-14 RX ORDER — MORPHINE SULFATE 10 MG/ML
4 INJECTION INTRAMUSCULAR; INTRAVENOUS; SUBCUTANEOUS
Status: DISCONTINUED | OUTPATIENT
Start: 2020-06-14 | End: 2020-06-14

## 2020-06-14 RX ORDER — MORPHINE SULFATE 10 MG/ML
6 INJECTION INTRAMUSCULAR; INTRAVENOUS; SUBCUTANEOUS
Status: COMPLETED | OUTPATIENT
Start: 2020-06-14 | End: 2020-06-14

## 2020-06-14 RX ORDER — MORPHINE SULFATE 10 MG/ML
2 INJECTION INTRAMUSCULAR; INTRAVENOUS; SUBCUTANEOUS EVERY 4 HOURS PRN
Status: DISCONTINUED | OUTPATIENT
Start: 2020-06-14 | End: 2020-06-14 | Stop reason: HOSPADM

## 2020-06-14 RX ORDER — IBUPROFEN 200 MG
24 TABLET ORAL
Status: DISCONTINUED | OUTPATIENT
Start: 2020-06-14 | End: 2020-06-14 | Stop reason: HOSPADM

## 2020-06-14 RX ORDER — MAGNESIUM SULFATE 1 G/100ML
1 INJECTION INTRAVENOUS
Status: COMPLETED | OUTPATIENT
Start: 2020-06-14 | End: 2020-06-14

## 2020-06-14 RX ORDER — GLUCAGON 1 MG
1 KIT INJECTION
Status: DISCONTINUED | OUTPATIENT
Start: 2020-06-14 | End: 2020-06-14 | Stop reason: HOSPADM

## 2020-06-14 RX ADMIN — MAGNESIUM SULFATE IN WATER 2 G: 40 INJECTION, SOLUTION INTRAVENOUS at 05:06

## 2020-06-14 RX ADMIN — MAGNESIUM SULFATE 1 G: 1 INJECTION INTRAVENOUS at 01:06

## 2020-06-14 RX ADMIN — MORPHINE SULFATE 4 MG: 10 INJECTION INTRAVENOUS at 11:06

## 2020-06-14 RX ADMIN — ACETAMINOPHEN 650 MG: 325 TABLET ORAL at 10:06

## 2020-06-14 RX ADMIN — MIDAZOLAM HYDROCHLORIDE 2 MG: 1 INJECTION, SOLUTION INTRAMUSCULAR; INTRAVENOUS at 02:06

## 2020-06-14 RX ADMIN — MORPHINE SULFATE 6 MG: 10 INJECTION INTRAVENOUS at 02:06

## 2020-06-14 RX ADMIN — SODIUM CHLORIDE: 0.9 INJECTION, SOLUTION INTRAVENOUS at 05:06

## 2020-06-14 RX ADMIN — IOHEXOL 75 ML: 350 INJECTION, SOLUTION INTRAVENOUS at 12:06

## 2020-06-14 RX ADMIN — PIPERACILLIN AND TAZOBACTAM 4.5 G: 4; .5 INJECTION, POWDER, LYOPHILIZED, FOR SOLUTION INTRAVENOUS; PARENTERAL at 06:06

## 2020-06-14 NOTE — H&P
[facility] Hospital Medicine  History & Physical    Patient Name: Mamadou Meier  MRN: [id number]  Admission Date: 6/13/2020  Attending Physician: Scott Del Rosario MD   Primary Care Provider: Kermit Mccall MD         Patient information was obtained from patient, past medical records and ER records.     Subjective:     Principal Problem:Sepsis    Chief Complaint:   Chief Complaint   Patient presents with    Chest Pain     Patient c/o substernal chest pain with sob x 1 day.  Patient is screaming in triage and difficult to get information from.  Reports coughing and fever recently.  Patient fighting blood pressure cuff and refusing blood pressure.    Shortness of Breath        HPI: 44-year-old male with a history of epilepsy and hypertension comes in because of combativeness complaining of abdominal pain very agitated running fever tachycardic in the emergency department.  She has been given several doses of Valium and he has calmed down.  Appears that he is either having some sort of reaction to a illegal substance verses sepsis as he has elevated LFTs bili and alk-phos.  He does have some abdominal pain but it is hard to get it at good history out of him right now as he sedated.  Per emergency room physician it was difficult for them to get it history from him earlier because of his agitation.  He does have a history of epilepsy but does not have any evidence of overtly seizing in the emergency department.  Patient be referred for admission for fever tachycardia severe agitation with abdominal pain with possible evidence for cholangitis/cholecystitis.    Past Medical History:   Diagnosis Date    Epilepsy     Hypertension        Past Surgical History:   Procedure Laterality Date    FRACTURE SURGERY      Right hand       Review of patient's allergies indicates:   Allergen Reactions    No known drug allergies        No current facility-administered medications on file prior to encounter.       Current Outpatient Medications on File Prior to Encounter   Medication Sig    losartan-hydrochlorothiazide 100-25 mg (HYZAAR) 100-25 mg per tablet Take 1 tablet by mouth once daily.    zonisamide (ZONEGRAN) 100 MG Cap Take 3 capsules (300 mg total) by mouth every evening.     Family History     Problem Relation (Age of Onset)    Anxiety disorder Brother    Heart disease Father    Hypertension Mother    No Known Problems Sister, Brother        Tobacco Use    Smoking status: Current Every Day Smoker     Packs/day: 1.00     Years: 30.00     Pack years: 30.00     Types: Cigarettes    Smokeless tobacco: Never Used   Substance and Sexual Activity    Alcohol use: No    Drug use: No    Sexual activity: Yes     Partners: Female     Review of Systems   All other systems reviewed and are negative.    Objective:     Vital Signs (Most Recent):  Temp: 100.1 °F (37.8 °C) (06/14/20 0026)  Pulse: (!) 113 (06/14/20 0100)  Resp: (!) 22 (06/14/20 0100)  BP: (!) 152/104 (06/14/20 0100)  SpO2: 97 % (06/14/20 0100) Vital Signs (24h Range):  Temp:  [100.1 °F (37.8 °C)-102.8 °F (39.3 °C)] 100.1 °F (37.8 °C)  Pulse:  [110-127] 113  Resp:  [21-37] 22  SpO2:  [94 %-99 %] 97 %  BP: (136-198)/() 152/104     Weight: 74.8 kg (165 lb)  Body mass index is 25.09 kg/m².    Physical Exam  Vitals signs and nursing note reviewed.   Constitutional:       Appearance: He is well-developed.   HENT:      Head: Normocephalic and atraumatic.   Eyes:      Pupils: Pupils are equal, round, and reactive to light.   Neck:      Musculoskeletal: Normal range of motion and neck supple.   Cardiovascular:      Rate and Rhythm: Normal rate and regular rhythm.      Heart sounds: Normal heart sounds. No murmur.   Pulmonary:      Effort: Pulmonary effort is normal. No respiratory distress.      Breath sounds: Normal breath sounds. No wheezing.   Abdominal:      General: Bowel sounds are normal. There is no distension.      Palpations: Abdomen is soft.       Tenderness: There is no abdominal tenderness. There is no guarding or rebound.   Musculoskeletal: Normal range of motion.         General: No deformity.   Skin:     General: Skin is warm and dry.      Capillary Refill: Capillary refill takes 2 to 3 seconds.      Findings: No rash.   Neurological:      Mental Status: He is alert and oriented to person, place, and time.      Cranial Nerves: No cranial nerve deficit.   Psychiatric:         Behavior: Behavior normal.         Thought Content: Thought content normal.         Judgment: Judgment normal.           CRANIAL NERVES     CN III, IV, VI   Pupils are equal, round, and reactive to light.       Significant Labs: All pertinent labs within the past 24 hours have been reviewed.    Significant Imaging: I have reviewed and interpreted all pertinent imaging results/findings within the past 24 hours.     Old chart reviewed  Case discussed with emergency room doctor Wiley      Assessment/Plan:     * Sepsis    Rule out sepsis.  Patient's presentation is unusual.  Could be also due to drug abuse he is positive for methamphetamines and his drug screen and cannot get a history of he is using meth or not.  Lactic acid is elevated along with procalcitonin level.  LFTs alk-phos and total bili is also mildly elevated.  Will cover with Zosyn.  CT imaging shows evidence of gallbladder disease possibly.  Abdominal exam is benign not distended and soft.  Repeat CMP in the morning.  Serial lactic acid every 3 hr.  Cover with Zosyn.  IV fluids.  Obtain GI consult in the morning.  No acute surgical intervention needed at this time.  Chest x-ray negative in UA clean.    Abdominal pain    Workup as above.  Abdominal exam benign.  Has abnormal liver function tests however.  Drug screen also positive for amphetamines.  Lipase also pending.    Epilepsy  No obvious seizures in the emergency department.  None also on my exam.  Obtain EEG as patient does has history of epilepsy per his epic  chart.  No focal neurological deficits noted either.      Hepatitis C carrier, genotype 2b  Noted      Anxiety  Noted      Hypertension    Blood pressure stable at this time    Hypo magnesemia.-Mag sulfate 2 g given tonight and repeat Mag level in the morning.    COVID-19 negative    VTE Risk Mitigation (From admission, onward)         Ordered     IP VTE LOW RISK PATIENT  Once      06/14/20 0335     Place sequential compression device  Until discontinued      06/14/20 0335                   Eleonora Peter MD  Department of Hospital Medicine   [facility] - [place], [place]

## 2020-06-14 NOTE — PROGRESS NOTES
Pt yelling and cursing, stated his head, back and abdomen hurts. Security called to bedside. Tylenol for HA given earlier with no relief, now morphine given to help with other symptoms.

## 2020-06-14 NOTE — ASSESSMENT & PLAN NOTE
Workup as above.  Abdominal exam benign.  Has abnormal liver function tests however.  Drug screen also positive for amphetamines.

## 2020-06-14 NOTE — ASSESSMENT & PLAN NOTE
No obvious seizures in the emergency department.  None also on my exam.  Obtain EEG as patient does has history of epilepsy per his epic chart.  No focal neurological deficits noted either.

## 2020-06-14 NOTE — CARE UPDATE
Reviewed H and P by my colleague and agree with A and P.  Patient admitted for possible sepsis- source may be cholecystis- elevated bili/LFT's. GI consulted. On Zosyn. Patient admits to using methamphetamines.  Now alert and oriented. Non-ill appearing. Continue supportive care, GI consult.

## 2020-06-14 NOTE — ED PROVIDER NOTES
Encounter Date: 6/13/2020       History     Chief Complaint   Patient presents with    Chest Pain     Patient c/o substernal chest pain with sob x 1 day.  Patient is screaming in triage and difficult to get information from.  Reports coughing and fever recently.  Patient fighting blood pressure cuff and refusing blood pressure.    Shortness of Breath     44-year-old male with history of epilepsy presenting with chest pain, abdominal pain, fever.  Patient reports symptoms started earlier today.  Patient is a difficult historian, unable to significantly participate in the interview due to pain or altered mental status.        Review of patient's allergies indicates:   Allergen Reactions    No known drug allergies      Past Medical History:   Diagnosis Date    Epilepsy     Hypertension      Past Surgical History:   Procedure Laterality Date    FRACTURE SURGERY      Right hand     Family History   Problem Relation Age of Onset    Hypertension Mother     Heart disease Father     No Known Problems Sister     Anxiety disorder Brother     No Known Problems Brother      Social History     Tobacco Use    Smoking status: Current Every Day Smoker     Packs/day: 1.00     Years: 30.00     Pack years: 30.00     Types: Cigarettes    Smokeless tobacco: Never Used   Substance Use Topics    Alcohol use: No    Drug use: No     Review of Systems   Unable to perform ROS: Acuity of condition       Physical Exam     Initial Vitals [06/13/20 2103]   BP Pulse Resp Temp SpO2   -- (!) 127 (!) 24 (!) 102.8 °F (39.3 °C) 99 %      MAP       --         Physical Exam    Nursing note and vitals reviewed.  Constitutional: He appears well-developed and well-nourished. He is not diaphoretic. He appears distressed.   HENT:   Head: Normocephalic and atraumatic.   Right Ear: External ear normal.   Left Ear: External ear normal.   Dry mucous membranes   Eyes: Conjunctivae and EOM are normal. Pupils are equal, round, and reactive to light. No  scleral icterus.   Neck: Normal range of motion. Neck supple.   Cardiovascular: Normal rate, regular rhythm, normal heart sounds and intact distal pulses. Exam reveals no gallop and no friction rub.    No murmur heard.  Pulmonary/Chest: Breath sounds normal. No stridor. No respiratory distress. He has no wheezes. He has no rhonchi. He has no rales.   Abdominal: Soft. Bowel sounds are normal. He exhibits distension. There is abdominal tenderness. There is no rebound and no guarding.   Abdominal tenderness on the right side to palpation   Musculoskeletal: Normal range of motion. No tenderness or edema.   Neurological: He is alert and oriented to person, place, and time. He has normal strength. No cranial nerve deficit. GCS score is 15. GCS eye subscore is 4. GCS verbal subscore is 5. GCS motor subscore is 6.   Skin: Skin is warm and dry. No rash noted.   Psychiatric:   Agitated, pressured speech         ED Course   Critical Care    Date/Time: 6/13/2020 9:24 PM  Performed by: Michael Jama MD  Authorized by: Michael Jama MD   Direct patient critical care time: 30 minutes  Total critical care time (exclusive of procedural time) : 30 minutes  Critical care was necessary to treat or prevent imminent or life-threatening deterioration of the following conditions: sepsis.        Labs Reviewed   CULTURE, BLOOD   CULTURE, BLOOD   INFLUENZA A & B BY MOLECULAR   CBC W/ AUTO DIFFERENTIAL   COMPREHENSIVE METABOLIC PANEL   LACTIC ACID, PLASMA   URINALYSIS, REFLEX TO URINE CULTURE   MAGNESIUM   B-TYPE NATRIURETIC PEPTIDE   TROPONIN I   PROCALCITONIN   C-REACTIVE PROTEIN   SARS-COV-2 RNA AMPLIFICATION, QUAL   DRUG SCREEN PANEL, URINE EMERGENCY   TSH   CK     EKG Readings: (Independently Interpreted)   Initial Reading: No STEMI. Previous EKG: Compared with most recent EKG Rhythm: Sinus Tachycardia. Heart Rate: 124.   No ST segment elevation or depression concerning for acute ischemia.          Imaging Results           X-Ray Chest AP Portable (In process)                  Medical Decision Making:   Initial Assessment:   44-year-old male with history of epilepsy presenting with chest pain, abdominal pain, delirium, agitation.  On exam, the patient is hyperactive, pressured speech, somewhat tremulous.  He has a fever, tachycardia.  He has some tenderness to palpation his abdomen though the history and exam is somewhat unreliable given his degree of distress.  I do not find a history and hospital records or outside records of alcohol or benzo dependence, though his appearance and presentation seems similar to it.  Other considerations include sepsis, thyrotoxicosis, severe dehydration, encephalopathy.  We will get labs, chest x-ray, give Valium, morphine, and reassess.   ED Management:  Patient requiring multiple doses of Valium and morphine to achieve sedation.  Patient had been experiencing significant delirium, yelling, possible hallucinations.  After 40 mg of Valium, patient is somewhat calm though still mildly agitated.  Patient is apologetic about prior behavior.  Vital slightly improved though still tachycardic and hypertensive.  Labs have multiple abnormalities including elevated LFTs, elevated bili, elevated alk-phos.  No peritoneal signs on sedated palpation of the abdomen.  Patient does still report right upper quadrant abdominal tenderness.  CT chest abdomen pelvis shows some signs of gallbladder disease though no definite stones.  Right upper quadrant ultrasound is equivocal.  Patient given vanc and Zosyn.  TSH low though free T4 normal.     Exact cause of symptoms unclear.  Amphetamine overdose possible, ascending cholangitis possible, benzo or alcohol withdrawal possible.  Patient fever improved, sepsis still possible.  Source not entirely clear, doubt pneumonia, biliary cause possible.  CT head showed incidental findings of possible age-indeterminate infarcts?  No focal neuro deficits.  Patient denies significant  headache, neck pain, meningismus. Feel stroke is less likely given the patient's presentation is more consistent with sepsis or toxidrome.  Patient will be admitted due to Hospital Medicine for further evaluation, GI consult.                                 Clinical Impression:       ICD-10-CM ICD-9-CM   1. Sepsis, due to unspecified organism, unspecified whether acute organ dysfunction present  A41.9 038.9     995.91   2. Fever of unknown origin  R50.9 780.60   3. Agitation  R45.1 307.9   4. Right upper quadrant abdominal pain  R10.11 789.01         Disposition:   Disposition: Admitted  Condition: Stable                        Michael Jama MD  06/14/20 0315

## 2020-06-14 NOTE — HPI
44-year-old male with a history of epilepsy and hypertension comes in because of combativeness complaining of abdominal pain very agitated running fever tachycardic in the emergency department.  She has been given several doses of Valium and he has calmed down.  Appears that he is either having some sort of reaction to a illegal substance verses sepsis as he has elevated LFTs bili and alk-phos.  He does have some abdominal pain but it is hard to get it at good history out of him right now as he sedated.  Per emergency room physician it was difficult for them to get it history from him earlier because of his agitation.  He does have a history of epilepsy but does not have any evidence of overtly seizing in the emergency department.  Patient be referred for admission for fever tachycardia severe agitation with abdominal pain with possible evidence for cholangitis/cholecystitis.

## 2020-06-14 NOTE — PLAN OF CARE
Problem: Fall Injury Risk  Goal: Absence of Fall and Fall-Related Injury  Outcome: Met     Problem: Adult Inpatient Plan of Care  Goal: Plan of Care Review  Outcome: Met  Goal: Patient-Specific Goal (Individualization)  Outcome: Met  Goal: Absence of Hospital-Acquired Illness or Injury  Outcome: Met  Goal: Optimal Comfort and Wellbeing  Outcome: Met  Goal: Readiness for Transition of Care  Outcome: Met  Goal: Rounds/Family Conference  Outcome: Met     Problem: Adjustment to Illness (Sepsis/Septic Shock)  Goal: Optimal Coping  Outcome: Met     Problem: Bleeding (Sepsis/Septic Shock)  Goal: Absence of Bleeding  Outcome: Met     Problem: Glycemic Control Impaired (Sepsis/Septic Shock)  Goal: Blood Glucose Level Within Desired Range  Outcome: Met     Problem: Hemodynamic Instability (Sepsis/Septic Shock)  Goal: Effective Tissue Perfusion  Outcome: Met     Problem: Infection (Sepsis/Septic Shock)  Goal: Absence of Infection Signs/Symptoms  Outcome: Met     Problem: Nutrition Impaired (Sepsis/Septic Shock)  Goal: Optimal Nutrition Intake  Outcome: Met     Problem: Respiratory Compromise (Sepsis/Septic Shock)  Goal: Effective Oxygenation and Ventilation  Outcome: Met

## 2020-06-14 NOTE — PROGRESS NOTES
Pharmacist Renal Dose Adjustment Note    Mamadou Meier is a 44 y.o. male being treated with the medication Zosyn 3.375gm    Patient Data:    Vital Signs (Most Recent):  Temp: 100.1 °F (37.8 °C) (06/14/20 0026)  Pulse: (!) 113 (06/14/20 0100)  Resp: (!) 22 (06/14/20 0100)  BP: (!) 152/104 (06/14/20 0100)  SpO2: 97 % (06/14/20 0100)   Vital Signs (72h Range):  Temp:  [100.1 °F (37.8 °C)-102.8 °F (39.3 °C)]   Pulse:  [110-127]   Resp:  [21-37]   BP: (136-198)/()   SpO2:  [94 %-99 %]      Recent Labs   Lab 06/13/20 2123   CREATININE 0.9     Serum creatinine: 0.9 mg/dL 06/13/20 2123  Estimated creatinine clearance: 99.2 mL/min    Medication:Zosyn 3.375gm q6h ordered.  As per Renal Dose adjustment per Pharmacy Protocol, Zosyn dose will be adjusted to 4.5 gm Q8h.    Pharmacist's Name: Garry Pearson  Pharmacist's Extension: [phone]

## 2020-06-14 NOTE — PROGRESS NOTES
pt found sitting up in bed, IV tubing popped, IV pulled out, bleeding everywhere. Pt stated he got tangled up trying to turn over and freaked out from the blood. Pt assisted to bathroom to wash off. After coming out of the bathroom, he stated he wanted to leave and he'll sign whatever paperwork needed. Stated he felt better and needed to check on his dog that with a friend, his daughter is waiting for him downstairs by ER for .  Pt answered all orientation questions correctly. pt seems agitated but not confused. Dr Del Rosario notified. Other IVs removed.  pt escorted to elevator

## 2020-06-14 NOTE — CONSULTS
.[facility]  Gastroenterology  Consult Note    Patient Name: Mamadou Meier  MRN: [id number]  Admission Date: 6/13/2020  Hospital Length of Stay: 0 days  Code Status: Full Code   Primary Care Physician: Kermit Mccall MD  Principal Problem:Sepsis    Consults  Subjective:     Chief complaint: abdominal pain    HPI: 43 yo man with hx of HTN was doing well until yesterday developed fever chills associated with chest pain radiating to back and also abdominal pian with no N&V. Workup in ER revealed mild elevation of LFT's no gallstones but mild thickening of gallbladder wall. Patient had Tmax of over 102F last night. Presently confused c/o chest pain and headache.Abdominal pain mostly in LUQ area 5/10 in intensity non radiating.No melena or blood from rectum . Last BM this morning, normal.    Past medical history:  Past Medical History:   Diagnosis Date    Epilepsy     Hypertension        Past surgical history:  Past Surgical History:   Procedure Laterality Date    FRACTURE SURGERY      Right hand       Social history:  Social History     Socioeconomic History    Marital status:      Spouse name: Not on file    Number of children: Not on file    Years of education: Not on file    Highest education level: Not on file   Occupational History    Occupation: disability from epilepsy   Social Needs    Financial resource strain: Not on file    Food insecurity     Worry: Not on file     Inability: Not on file    Transportation needs     Medical: Not on file     Non-medical: Not on file   Tobacco Use    Smoking status: Current Every Day Smoker     Packs/day: 1.00     Years: 30.00     Pack years: 30.00     Types: Cigarettes    Smokeless tobacco: Never Used   Substance and Sexual Activity    Alcohol use: No    Drug use: No    Sexual activity: Yes     Partners: Female   Lifestyle    Physical activity     Days per week: Not on file     Minutes per session: Not on file    Stress: Not on file    Relationships    Social connections     Talks on phone: Not on file     Gets together: Not on file     Attends Taoist service: Not on file     Active member of club or organization: Not on file     Attends meetings of clubs or organizations: Not on file     Relationship status: Not on file   Other Topics Concern    Not on file   Social History Narrative    Not on file       Family history:  Family History   Problem Relation Age of Onset    Hypertension Mother     Heart disease Father     No Known Problems Sister     Anxiety disorder Brother     No Known Problems Brother        Medications:  Medications Prior to Admission   Medication Sig Dispense Refill Last Dose    losartan-hydrochlorothiazide 100-25 mg (HYZAAR) 100-25 mg per tablet Take 1 tablet by mouth once daily. 30 tablet 5     zonisamide (ZONEGRAN) 100 MG Cap Take 3 capsules (300 mg total) by mouth every evening. 90 capsule 5        Allergies:  Review of patient's allergies indicates:   Allergen Reactions    No known drug allergies        Review of systems:  CONSTITUTIONAL: Negative for fever, chills, weakness, weight loss, weight gain.  HEENT: Negative for blurred vision, hearing loss, nasal congestion, dry mouth, sore throat.  CARDIOVASCULAR: Negative for chest pain or palpitations.  RESPIRATORY: Negative for SOB or cough.  GASTROINTESTINAL: See HPI  GENITOURINARY: Negative for dysuria or hematuria.  MUSCULOSKELETAL: Negative for osteoarthritis or muscle pain.  SKIN: Negative for rashes/lesions.  NEUROLOGIC:positive  for headaches, numbness/tingling.  ENDOCRINE: Negative for diabetes or thyroid abnormalities.  HEMATOLOGIC: Negative for anemia or blood dyscrasias.  Aside from above positives, complete 10 point review of systems negative.    Objective:     Vital Signs (Most Recent):  Temp: 98.1 °F (36.7 °C) (06/14/20 0809)  Pulse: 103 (06/14/20 0809)  Resp: 18 (06/14/20 0809)  BP: (!) 146/96 (06/14/20 0809)  SpO2: 100 % (06/14/20 0810) Vital  Signs (24h Range):  Temp:  [98.1 °F (36.7 °C)-102.8 °F (39.3 °C)] 98.1 °F (36.7 °C)  Pulse:  [103-127] 103  Resp:  [18-37] 18  SpO2:  [94 %-100 %] 100 %  BP: (126-198)/() 146/96     Physical examination:  General: well developed, well nourished, no apparent distress  HENT: NCAT, hearing grossly intact, no palpable or visible thyroid mass  Eyes: PERRL, EOMI, anicteric sclera  LYMH: No cervical, supraclavicular or axillary lymphadenopathy   Cardiovascular: Regular rate and rhythm. No murmurs appreciated.  Lungs: Non-labored respirations. Breath sounds equal.   Abdomen: soft, NTND, normoactive BS  Extremities: No C/C/E, 2+ dorsalis pedis pulses bilaterally  Neuro: AA&O x 3, no asterixes or tremors  Psych: Appropriate mood and affect. No SI.  Skin: No jaundice, rashes or lesions  Musculoskeletal: 5/5 strength bilaterally    Labs:  Acute Hepatitis Panel: No results for input(s): HEPBSAG, HEPAIGM, HEPCAB in the last 48 hours.    Invalid input(s): HAPBIGM  Amylase: No results for input(s): AMYLASE in the last 48 hours.  Blood Culture:   Recent Labs   Lab 06/13/20 2126 06/13/20 2218   LABBLOO No Growth to date No Growth to date     CBC:   Recent Labs   Lab 06/13/20 2123 06/14/20 0613   WBC 2.88* 30.21*   HGB 14.5 15.3   HCT 41.4 43.1   * 94*     CMP:   Recent Labs   Lab 06/14/20 0613      CALCIUM 8.3*   ALBUMIN 3.1*   PROT 6.0      K 3.6   CO2 21*      BUN 14   CREATININE 0.9   ALKPHOS 103   *   *   BILITOT 5.0*     Coagulation: No results for input(s): PT, INR, APTT in the last 48 hours.  CRP:   Recent Labs   Lab 06/13/20 2123   CRP 13.4*       Imaging:  CT scan of chest and abdomen and pelvis: 4mm right apical nodule in chest, mild gallbladder wall thickening. No gall stones.      Assessment:   1. Abdominal pain  2.abnormal LFT's  3.drug abuse  4.Fever  5. Hx of hepatitis C    Plan:   Doubt this patient has cholangitis on exam and imaging. Etiology for fever is not  clear. Has hx of drug abuse and hepatitis C in past. Will follow with you .        Thank you for your consult.     Mila Glass MD  Gastroenterology  [facility]

## 2020-06-14 NOTE — SUBJECTIVE & OBJECTIVE
Past Medical History:   Diagnosis Date    Epilepsy     Hypertension        Past Surgical History:   Procedure Laterality Date    FRACTURE SURGERY      Right hand       Review of patient's allergies indicates:   Allergen Reactions    No known drug allergies        No current facility-administered medications on file prior to encounter.      Current Outpatient Medications on File Prior to Encounter   Medication Sig    losartan-hydrochlorothiazide 100-25 mg (HYZAAR) 100-25 mg per tablet Take 1 tablet by mouth once daily.    zonisamide (ZONEGRAN) 100 MG Cap Take 3 capsules (300 mg total) by mouth every evening.     Family History     Problem Relation (Age of Onset)    Anxiety disorder Brother    Heart disease Father    Hypertension Mother    No Known Problems Sister, Brother        Tobacco Use    Smoking status: Current Every Day Smoker     Packs/day: 1.00     Years: 30.00     Pack years: 30.00     Types: Cigarettes    Smokeless tobacco: Never Used   Substance and Sexual Activity    Alcohol use: No    Drug use: No    Sexual activity: Yes     Partners: Female     Review of Systems   All other systems reviewed and are negative.    Objective:     Vital Signs (Most Recent):  Temp: 100.1 °F (37.8 °C) (06/14/20 0026)  Pulse: (!) 113 (06/14/20 0100)  Resp: (!) 22 (06/14/20 0100)  BP: (!) 152/104 (06/14/20 0100)  SpO2: 97 % (06/14/20 0100) Vital Signs (24h Range):  Temp:  [100.1 °F (37.8 °C)-102.8 °F (39.3 °C)] 100.1 °F (37.8 °C)  Pulse:  [110-127] 113  Resp:  [21-37] 22  SpO2:  [94 %-99 %] 97 %  BP: (136-198)/() 152/104     Weight: 74.8 kg (165 lb)  Body mass index is 25.09 kg/m².    Physical Exam  Vitals signs and nursing note reviewed.   Constitutional:       Appearance: He is well-developed.   HENT:      Head: Normocephalic and atraumatic.   Eyes:      Pupils: Pupils are equal, round, and reactive to light.   Neck:      Musculoskeletal: Normal range of motion and neck supple.   Cardiovascular:      Rate and  Rhythm: Normal rate and regular rhythm.      Heart sounds: Normal heart sounds. No murmur.   Pulmonary:      Effort: Pulmonary effort is normal. No respiratory distress.      Breath sounds: Normal breath sounds. No wheezing.   Abdominal:      General: Bowel sounds are normal. There is no distension.      Palpations: Abdomen is soft.      Tenderness: There is no abdominal tenderness. There is no guarding or rebound.   Musculoskeletal: Normal range of motion.         General: No deformity.   Skin:     General: Skin is warm and dry.      Capillary Refill: Capillary refill takes 2 to 3 seconds.      Findings: No rash.   Neurological:      Mental Status: He is alert and oriented to person, place, and time.      Cranial Nerves: No cranial nerve deficit.   Psychiatric:         Behavior: Behavior normal.         Thought Content: Thought content normal.         Judgment: Judgment normal.           CRANIAL NERVES     CN III, IV, VI   Pupils are equal, round, and reactive to light.       Significant Labs: All pertinent labs within the past 24 hours have been reviewed.    Significant Imaging: I have reviewed and interpreted all pertinent imaging results/findings within the past 24 hours.     Old chart reviewed  Case discussed with emergency room doctor Wiley

## 2020-06-14 NOTE — NURSING
Patient states his dog is in his car parked near the emergency room. Patient called his friend to come get his keys and  his dog. Patient handed keys to LENKA Lincoln.

## 2020-06-14 NOTE — ASSESSMENT & PLAN NOTE
Rule out sepsis.  Patient's presentation is unusual.  Could be also due to drug abuse he is positive for methamphetamines and his drug screen and cannot get a history of he is using meth or not.  Lactic acid is elevated along with procalcitonin level.  LFTs alk-phos and total bili is also mildly elevated.  Will cover with Zosyn.  CT imaging shows evidence of gallbladder disease possibly.  Abdominal exam is benign not distended and soft.  Repeat CMP in the morning.  Serial lactic acid every 3 hr.  Cover with Zosyn.  IV fluids.  Obtain GI consult in the morning.  No acute surgical intervention needed at this time.

## 2020-06-15 PROBLEM — J43.8 OTHER EMPHYSEMA: Status: ACTIVE | Noted: 2020-06-15

## 2020-06-15 NOTE — HOSPITAL COURSE
Patient admitted to the hospital due to abdominal pain and possible sepsis.  The patient admitted to using methamphetamine.  Patient found to have elevated LFT's. GI was consulted. The patient left A on 6/14/20

## 2020-06-15 NOTE — DISCHARGE SUMMARY
[facility] Hospital Medicine  Discharge Summary      Patient Name: Mamadou Meier  MRN: [id number]  Admission Date: 6/13/2020  Hospital Length of Stay: 1 days  Discharge Date and Time:  6/15/20  Attending Physician: No att. providers found   Discharging Provider: Scott Valladares MD  Primary Care Provider: Kermit Mccall MD      HPI:   44-year-old male with a history of epilepsy and hypertension comes in because of combativeness complaining of abdominal pain very agitated running fever tachycardic in the emergency department.  She has been given several doses of Valium and he has calmed down.  Appears that he is either having some sort of reaction to a illegal substance verses sepsis as he has elevated LFTs bili and alk-phos.  He does have some abdominal pain but it is hard to get it at good history out of him right now as he sedated.  Per emergency room physician it was difficult for them to get it history from him earlier because of his agitation.  He does have a history of epilepsy but does not have any evidence of overtly seizing in the emergency department.  Patient be referred for admission for fever tachycardia severe agitation with abdominal pain with possible evidence for cholangitis/cholecystitis.    * No surgery found *      Hospital Course:   Patient admitted to the hospital due to abdominal pain and possible sepsis.  The patient admitted to using methamphetamine.  Patient found to have elevated LFT's. GI was consulted. The patient left AMA on 6/14/20     Consults:     No new Assessment & Plan notes have been filed under this hospital service since the last note was generated.  Service: Hospital Medicine    Final Active Diagnoses:    Diagnosis Date Noted POA    PRINCIPAL PROBLEM:  Sepsis [A41.9] 06/14/2020 Yes    Epilepsy [G40.909] 06/14/2020 Yes    Abdominal pain [R10.9] 06/14/2020 Yes    Hepatitis C carrier, genotype 2b [B18.2] 03/02/2018 Not Applicable    Anxiety [F41.9]  01/29/2018 Yes    Hypertension [I10]  Yes      Problems Resolved During this Admission:       Discharged Condition: against medical advice    Disposition: Left Against Medical Adv*    Follow Up:    Patient Instructions:   No discharge procedures on file.    Significant Diagnostic Studies:     Pending Diagnostic Studies:     None         Medications:  Reconciled Home Medications:      Medication List      ASK your doctor about these medications    losartan-hydrochlorothiazide 100-25 mg 100-25 mg per tablet  Commonly known as: HYZAAR  Take 1 tablet by mouth once daily.     zonisamide 100 MG Cap  Commonly known as: ZONEGRAN  Take 3 capsules (300 mg total) by mouth every evening.            Indwelling Lines/Drains at time of discharge:   Lines/Drains/Airways     None                 Time spent on the discharge of patient: < 30 minutes  Patient was seen and examined on the date of discharge and determined to be suitable for discharge.         Scott Valladares MD  Department of Hospital Medicine  [facility]

## 2020-06-17 LAB
BACTERIA BLD CULT: NORMAL
BACTERIA BLD CULT: NORMAL

## 2020-06-17 NOTE — PHYSICIAN QUERY
PT Name: Mamadou Meier  MR #: [id number]     Diagnosis Clarification      CDS: Jo Ann Michelle RN, CCDS         Contact information :ext [phone] [email or web address]       This form is a permanent document in the medical record.     Query Date: June 17, 2020    Dear Provider,  By submitting this query, we are merely seeking further clarification of documentation.  Please utilize your independent clinical judgment when addressing the question(s) below.     The medical record contains the following:    Supporting Clinical Information Location in Medical Record     Exact cause of symptoms unclear.  Amphetamine overdose possible, ascending cholangitis possible, benzo or alcohol withdrawal possible.     Patient had been experiencing significant delirium, yelling, possible hallucinations.   Patient is a difficult historian, unable to significantly participate in the interview due to pain or altered mental status    Appears that he is either having some sort of reaction to a illegal substance verses sepsis as he has elevated LFTs bili and alk-phos    Amphetamine-presumptive positive  Opiates-presumptive positive    Patient admits to using methamphetamines.  Now alert and oriented   ED Provider note 6/13/20        ED Provider note 6/13/20          H&P 6/13/20        Lab 6/13/20      Hospital Medicine Care Note  6/14/20     Please clarify if the  Amphetamine Overdose diagnosis has been:    [   ] Ruled In   [   ] Ruled In, Now Resolved   [   ] Ruled Out   [   ] Other/Clarification of findings (please specify)_______________                 [  x ] Clinically undetermined

## 2020-06-17 NOTE — PHYSICIAN QUERY
PT Name: Mamadou Meier  MR #: [id number]     Physician Query Form - Documentation Clarification      CDS: Jo Ann Michelle RN, CCDS         Contact information :ext [phone] [email or web address]       This form is a permanent document in the medical record.     Query Date: June 17, 2020    By submitting this query, we are merely seeking further clarification of documentation. Please utilize your independent clinical judgment when addressing the question(s) below.    The Medical Record reflects the following:    Clinical Findings Location in Medical Record     Sepsis  Rule out sepsis.  Patient's presentation is unusual.  Could be also due to drug abuse he is positive for methamphetamines   Lactic acid is elevated along with procalcitonin level.  LFTs alk-phos and total bili is also mildly elevated.  Will cover with Zosyn  Vital Signs (24h Range):  Temp:  [100.1 °F (37.8 °C)-102.8 °F (39.3 °C)] 100.1 °F (37.8 °C)  Pulse:  [110-127] 113  Resp:  [21-37] 22  SpO2:  [94 %-99 %] 97 %  BP: (136-198)/() 152/104    WBC 2.28, Platelets 147, Lactate 3.7, Procal 1.56  WBC 30.21, Platelets 94    Patient admitted to the hospital due to abdominal pain and possible sepsis.    The patient admitted to using methamphetamine.  Patient found to have elevated LFT's.   GI was consulted.   The patient left AMA on 6/14/20    H&P 6/13/20                              Lab 6/13/20  Lab 6/14/20    Discharge summary 6/15/20                                                                                Please clarify the diagnosis of Sepsis       Provider Use Only        [    ] Sepsis ruled in and additional clinical support/ decision making indicators for the diagnosis include (specify):_________________________________    [    ] Sepsis  has been ruled out    [    ] Sepsis has been ruled out, other diagnosis ruled in (specify):___________    [    ] Other clarification (specify): ______________    [   x ] Clinically undetermined

## 2020-06-17 NOTE — PHYSICIAN QUERY
PT Name: Mamadou Meier  MR #: [id number]     CDS: Jo Ann Michelle RN, CCDS         Contact information :ext [phone] [email or web address]   :  This form is a permanent document in the medical record.     Query Date: June 17, 2020    Physician Query - Neurological Condition Clarification    By submitting this query, we are merely seeking further clarification of documentation to reflect the severity of illness of your patient. Please utilize your independent clinical judgment when addressing the question(s) below.    The Medical record reflects the following:     Indicators   Supporting Clinical Findings Location in Medical Record   x AMS, Confusion,  LOC, etc.  Patient had been experiencing significant delirium, yelling, possible hallucinations.   Patient is a difficult historian, unable to significantly participate in the interview due to pain or altered mental status    Presently confused  ED Provider note 6/13/20            GI Consult 6/14/20   x Acute / Chronic Illness Amphetamine overdose possible, ascending cholangitis possible, benzo or alcohol withdrawal possible.  Patient fever improved, sepsis still possible.     Sepsis   Appears that he is either having some sort of reaction to a illegal substance verses sepsis as he has elevated LFTs bili and alk-phos.   history of epilepsy and hypertension comes in because of combativeness complaining of abdominal pain very agitated running fever tachycardic in the emergency department ED Provider note 6/13/20          H&P 6/13/20   x Radiology Findings CT head showed incidental findings of possible age-indeterminate infarcts? ED Provider note 6/13/20   x Electrolyte Imbalance Magnesium 1.3 Lab 6/13/20   x Medication diazePAM injection 20 mg  vancomycin 1.5 g in dextrose 5 % 250 mL IVPB   sodium chloride 0.9% bolus 2,244 mL    MAR 6/13/20    Treatment             x Other patient's presentation is more consistent with sepsis or toxidrome.    Amphetamine-presumptive  positive  Opiates-presumptive positive ED Provider note 6/13/20      Lab 6/13/20     Encephalopathy- is a general term for any diffuse disease of the brain that alters brain function or structure. Treatment of the cognitive dysfunction varies but is ultimately dependent on the treatment of the underlying condition.  Major Symptoms of Encephalopathy - Decreased level of consciousness, fluctuating alertness/concentration, confusion, agitation, lethargy, somnolence, drowsiness, obtundation, stupor, or coma.         References: National Institutes of Healths (NIH) National [place] of Neurological Disorders and Strokes;  HCPro 2016; Advisory Board   Clinical Guidelines:   These guidelines will set system standards to assist providers in managing, documentation, and coding of encephalopathy. The intent of this document is to serve as a system guideline, not replace the providers clinical judgment:    Doctor, please specify the diagnosis or diagnoses associated with above clinical findings.    [   ] Metabolic Encephalopathy - Due to electrolye imbalance, metabolic derangements, or infections processes, includes Septic Encephalopathy   [   ] Toxic Encephalopathy - Due to drugs, chemicals, or other toxic substances, please specify drug, _______________   [   ] Other Encephalopathy - please specify, _________   [   ] Unspecified Encephalopathy      [   ] Other Neurological Condition-  (please specify condition): _________   [  x ]  Clinically Undetermined

## 2020-10-05 ENCOUNTER — PATIENT MESSAGE (OUTPATIENT)
Dept: ADMINISTRATIVE | Facility: HOSPITAL | Age: 45
End: 2020-10-05

## 2021-01-04 ENCOUNTER — PATIENT MESSAGE (OUTPATIENT)
Dept: ADMINISTRATIVE | Facility: HOSPITAL | Age: 46
End: 2021-01-04

## 2021-04-15 ENCOUNTER — PATIENT MESSAGE (OUTPATIENT)
Dept: RESEARCH | Facility: HOSPITAL | Age: 46
End: 2021-04-15